# Patient Record
Sex: MALE | Race: WHITE | Employment: FULL TIME | URBAN - NONMETROPOLITAN AREA
[De-identification: names, ages, dates, MRNs, and addresses within clinical notes are randomized per-mention and may not be internally consistent; named-entity substitution may affect disease eponyms.]

---

## 2018-02-22 ENCOUNTER — HOSPITAL ENCOUNTER (EMERGENCY)
Age: 58
Discharge: HOME | End: 2018-02-22
Attending: FAMILY MEDICINE

## 2018-02-22 ENCOUNTER — APPOINTMENT (OUTPATIENT)
Dept: CT IMAGING | Age: 58
End: 2018-02-22

## 2018-02-22 ENCOUNTER — APPOINTMENT (OUTPATIENT)
Dept: GENERAL RADIOLOGY | Age: 58
End: 2018-02-22

## 2018-02-22 VITALS
HEART RATE: 86 BPM | RESPIRATION RATE: 18 BRPM | HEIGHT: 75 IN | BODY MASS INDEX: 26.73 KG/M2 | TEMPERATURE: 98.1 F | OXYGEN SATURATION: 94 % | SYSTOLIC BLOOD PRESSURE: 140 MMHG | DIASTOLIC BLOOD PRESSURE: 87 MMHG | WEIGHT: 215 LBS

## 2018-02-22 DIAGNOSIS — M54.2 NECK PAIN: ICD-10-CM

## 2018-02-22 DIAGNOSIS — I10 ESSENTIAL HYPERTENSION: ICD-10-CM

## 2018-02-22 DIAGNOSIS — M43.6 TORTICOLLIS: Primary | ICD-10-CM

## 2018-02-22 LAB
ALBUMIN SERPL-MCNC: 3.5 G/DL (ref 3.5–5.1)
ALP BLD-CCNC: 81 U/L (ref 38–126)
ALT SERPL-CCNC: 9 U/L (ref 11–66)
ANION GAP SERPL CALCULATED.3IONS-SCNC: 14 MEQ/L (ref 8–16)
APTT: 26.1 SECONDS (ref 22–38)
AST SERPL-CCNC: 11 U/L (ref 5–40)
BASOPHILS # BLD: 0.1 %
BASOPHILS ABSOLUTE: 0 THOU/MM3 (ref 0–0.1)
BILIRUB SERPL-MCNC: 0.4 MG/DL (ref 0.3–1.2)
BUN BLDV-MCNC: 23 MG/DL (ref 7–22)
CALCIUM SERPL-MCNC: 9.7 MG/DL (ref 8.5–10.5)
CHLORIDE BLD-SCNC: 92 MEQ/L (ref 98–111)
CO2: 25 MEQ/L (ref 23–33)
CREAT SERPL-MCNC: 0.8 MG/DL (ref 0.4–1.2)
EKG ATRIAL RATE: 83 BPM
EKG P AXIS: 40 DEGREES
EKG P-R INTERVAL: 172 MS
EKG Q-T INTERVAL: 376 MS
EKG QRS DURATION: 102 MS
EKG QTC CALCULATION (BAZETT): 441 MS
EKG R AXIS: 62 DEGREES
EKG T AXIS: 63 DEGREES
EKG VENTRICULAR RATE: 83 BPM
EOSINOPHIL # BLD: 0 %
EOSINOPHILS ABSOLUTE: 0 THOU/MM3 (ref 0–0.4)
GFR SERPL CREATININE-BSD FRML MDRD: > 90 ML/MIN/1.73M2
GLUCOSE BLD-MCNC: 240 MG/DL (ref 70–108)
HCT VFR BLD CALC: 48.8 % (ref 42–52)
HEMOGLOBIN: 16.4 GM/DL (ref 14–18)
INR BLD: 1.09 (ref 0.85–1.13)
LYMPHOCYTES # BLD: 7 %
LYMPHOCYTES ABSOLUTE: 0.9 THOU/MM3 (ref 1–4.8)
MCH RBC QN AUTO: 30.5 PG (ref 27–31)
MCHC RBC AUTO-ENTMCNC: 33.5 GM/DL (ref 33–37)
MCV RBC AUTO: 91.1 FL (ref 80–94)
MONOCYTES # BLD: 6.9 %
MONOCYTES ABSOLUTE: 0.9 THOU/MM3 (ref 0.4–1.3)
NUCLEATED RED BLOOD CELLS: 0 /100 WBC
OSMOLALITY CALCULATION: 274.2 MOSMOL/KG (ref 275–300)
PDW BLD-RTO: 13.1 % (ref 11.5–14.5)
PLATELET # BLD: 202 THOU/MM3 (ref 130–400)
PMV BLD AUTO: 6.9 FL (ref 7.4–10.4)
POTASSIUM SERPL-SCNC: 3.8 MEQ/L (ref 3.5–5.2)
RBC # BLD: 5.36 MILL/MM3 (ref 4.7–6.1)
SEG NEUTROPHILS: 86 %
SEGMENTED NEUTROPHILS ABSOLUTE COUNT: 11.5 THOU/MM3 (ref 1.8–7.7)
SODIUM BLD-SCNC: 131 MEQ/L (ref 135–145)
TOTAL PROTEIN: 7.4 G/DL (ref 6.1–8)
TROPONIN T: < 0.01 NG/ML
WBC # BLD: 13.4 THOU/MM3 (ref 4.8–10.8)

## 2018-02-22 PROCEDURE — 6370000000 HC RX 637 (ALT 250 FOR IP): Performed by: FAMILY MEDICINE

## 2018-02-22 PROCEDURE — 99284 EMERGENCY DEPT VISIT MOD MDM: CPT

## 2018-02-22 PROCEDURE — 70490 CT SOFT TISSUE NECK W/O DYE: CPT

## 2018-02-22 PROCEDURE — 85730 THROMBOPLASTIN TIME PARTIAL: CPT

## 2018-02-22 PROCEDURE — 36415 COLL VENOUS BLD VENIPUNCTURE: CPT

## 2018-02-22 PROCEDURE — 71045 X-RAY EXAM CHEST 1 VIEW: CPT

## 2018-02-22 PROCEDURE — 93005 ELECTROCARDIOGRAM TRACING: CPT | Performed by: FAMILY MEDICINE

## 2018-02-22 PROCEDURE — 80053 COMPREHEN METABOLIC PANEL: CPT

## 2018-02-22 PROCEDURE — 96372 THER/PROPH/DIAG INJ SC/IM: CPT

## 2018-02-22 PROCEDURE — 85025 COMPLETE CBC W/AUTO DIFF WBC: CPT

## 2018-02-22 PROCEDURE — 84484 ASSAY OF TROPONIN QUANT: CPT

## 2018-02-22 PROCEDURE — 85610 PROTHROMBIN TIME: CPT

## 2018-02-22 PROCEDURE — 6360000002 HC RX W HCPCS: Performed by: FAMILY MEDICINE

## 2018-02-22 RX ORDER — HYDROCODONE BITARTRATE AND ACETAMINOPHEN 5; 325 MG/1; MG/1
1 TABLET ORAL EVERY 6 HOURS PRN
Qty: 12 TABLET | Refills: 0 | Status: SHIPPED | OUTPATIENT
Start: 2018-02-22 | End: 2018-02-25

## 2018-02-22 RX ORDER — DIAZEPAM 5 MG/1
5 TABLET ORAL ONCE
Status: COMPLETED | OUTPATIENT
Start: 2018-02-22 | End: 2018-02-22

## 2018-02-22 RX ORDER — IBUPROFEN 800 MG/1
800 TABLET ORAL EVERY 8 HOURS PRN
Qty: 30 TABLET | Refills: 0 | Status: ON HOLD | OUTPATIENT
Start: 2018-02-22 | End: 2018-03-07 | Stop reason: HOSPADM

## 2018-02-22 RX ORDER — KETOROLAC TROMETHAMINE 30 MG/ML
60 INJECTION, SOLUTION INTRAMUSCULAR; INTRAVENOUS ONCE
Status: COMPLETED | OUTPATIENT
Start: 2018-02-22 | End: 2018-02-22

## 2018-02-22 RX ORDER — HYDROCODONE BITARTRATE AND ACETAMINOPHEN 5; 325 MG/1; MG/1
1 TABLET ORAL ONCE
Status: COMPLETED | OUTPATIENT
Start: 2018-02-22 | End: 2018-02-22

## 2018-02-22 RX ORDER — DIAZEPAM 5 MG/1
5 TABLET ORAL EVERY 8 HOURS PRN
Qty: 10 TABLET | Refills: 0 | Status: ON HOLD | OUTPATIENT
Start: 2018-02-22 | End: 2018-03-07 | Stop reason: HOSPADM

## 2018-02-22 RX ORDER — ASPIRIN 325 MG
325 TABLET ORAL DAILY
Status: ON HOLD | COMMUNITY
End: 2018-03-07 | Stop reason: HOSPADM

## 2018-02-22 RX ADMIN — DIAZEPAM 5 MG: 5 TABLET ORAL at 11:16

## 2018-02-22 RX ADMIN — HYDROCODONE BITARTRATE AND ACETAMINOPHEN 1 TABLET: 5; 325 TABLET ORAL at 11:16

## 2018-02-22 RX ADMIN — KETOROLAC TROMETHAMINE 60 MG: 30 INJECTION, SOLUTION INTRAMUSCULAR at 13:22

## 2018-02-22 ASSESSMENT — PAIN SCALES - GENERAL
PAINLEVEL_OUTOF10: 10

## 2018-02-22 ASSESSMENT — ENCOUNTER SYMPTOMS
COUGH: 0
EYE DISCHARGE: 0
ABDOMINAL PAIN: 0
BLOOD IN STOOL: 0
CHEST TIGHTNESS: 0
EYE PAIN: 0
SORE THROAT: 0
SHORTNESS OF BREATH: 0

## 2018-02-22 ASSESSMENT — PAIN DESCRIPTION - LOCATION: LOCATION: NECK

## 2018-02-22 ASSESSMENT — PAIN DESCRIPTION - DESCRIPTORS: DESCRIPTORS: STABBING

## 2018-02-22 ASSESSMENT — PAIN DESCRIPTION - PAIN TYPE: TYPE: ACUTE PAIN

## 2018-02-22 ASSESSMENT — PAIN DESCRIPTION - FREQUENCY: FREQUENCY: CONTINUOUS

## 2018-02-22 NOTE — ED TRIAGE NOTES
Pt to ED room 1 for c/o neck pain since Sunday morning. Pt states, \"When I went home from work I couldn't turn my head because my neck hurt so bad. I have a big knot in there now and I have been using ice and heat and taking ibuprofen. \"  Rates pain 10/10. Isabela Horowitz MD at bedside for patient evaluation.

## 2018-02-22 NOTE — CARE COORDINATION
Brief ED Social Work Assessment  2/22/18, 11:56 AM    Patient stated that he does not have insurance and that he does not want a PCP. Patient to make provider aware of no insurance. Patient and wife denied any needs.

## 2018-02-22 NOTE — ED PROVIDER NOTES
no gallop and no friction rub. No murmur heard. Pulmonary/Chest: Effort normal. He has no wheezes. He has no rales. Abdominal: He exhibits no distension. There is no tenderness. There is no rebound and no guarding. Musculoskeletal: He exhibits no edema or tenderness. Lymphadenopathy:     He has no cervical adenopathy. Neurological: No cranial nerve deficit. Coordination normal.   Skin: No rash noted. No erythema. DIFFERENTIAL DIAGNOSIS:   Hematoma in the in the sternocleidomastoid, possible mass, possible new ENT cancer, possible adenopathy from upper respiratory tract infection. Also the patient has some concurrent chest pain with hypertensive response. DIAGNOSTIC RESULTS     EKG: All EKG's are interpreted by the Emergency Department Physician who either signs or Co-signs this chart in the absence of a cardiologist.  EKG performed at [phone] with a heart rate of 83, normal rate and rhythm left axis deviation nonspecific ST segment changes overall. RADIOLOGY: non-plain film images(s) such as CT, Ultrasound and MRI are read by the radiologist.  Plain radiographic images are visualized and preliminarily interpreted by the emergency physician unless otherwise stated below. Ct Soft Tissue Neck Wo Contrast    Result Date: 2/22/2018  PROCEDURE: CT SOFT TISSUE NECK WO CONTRAST CLINICAL INFORMATION: Right sternocleidomastoid mass TECHNIQUE: CT of the neck was performed without the use of intravenous contrast at the request of the ordering physician. Axial images as well as coronal and sagittal reconstructions were obtained. All CT scans at this facility use dose modulation, iterative reconstruction, and/or weight-based dosing when appropriate to reduce radiation dose to as low as reasonably achievable. COMPARISON: None FINDINGS: Evaluation is limited due to absence of contrast. The nasopharynx, tongue base and perform sinuses are normal. There is no tonsillar enlargement.  Submandibular and parotid glands are preserved. There is no lymphadenopathy or abnormal fluid collection. No focal soft tissue mass is identified on the limited noncontrast examination. The bilateral sternocleidomastoid muscles are symmetric. There are calcifications in the external carotid arteries. Visualized brain parenchyma, orbits, paranasal  sinuses and mastoid air cells are unremarkable. Minimal atherosclerotic calcifications are present in the thoracic aorta. Unremarkable noncontrast CT of the neck. Final report electronically signed by Dr. Johnny Mcgill on 2/22/2018 11:54 AM    Xr Chest 1 Vw    Result Date: 2/22/2018  PROCEDURE: XR CHEST 1 VIEW CLINICAL INFORMATION: chest pain, COMPARISON: No prior study. TECHNIQUE: A single PA view of the chest was obtained. 1. Normal heart size. Metallic sternotomy sutures and vascular clips from prior surgery. 2. No acute findings. No infiltrates or effusions are seen.  Final report electronically signed by Dr. Margot Muir on 2/22/2018 11:50 AM      LABS:   Labs Reviewed   CBC WITH AUTO DIFFERENTIAL - Abnormal; Notable for the following:        Result Value    WBC 13.4 (*)     MPV 6.9 (*)     Segs Absolute 11.5 (*)     Lymphocytes # 0.9 (*)     All other components within normal limits   COMPREHENSIVE METABOLIC PANEL - Abnormal; Notable for the following:     Glucose 240 (*)     BUN 23 (*)     Sodium 131 (*)     Chloride 92 (*)     ALT 9 (*)     All other components within normal limits   OSMOLALITY - Abnormal; Notable for the following:     Osmolality Calc 274.2 (*)     All other components within normal limits   PROTIME-INR   APTT   TROPONIN   GLOMERULAR FILTRATION RATE, ESTIMATED   ANION GAP       EMERGENCY DEPARTMENT COURSE:   Vitals:    Vitals:    02/22/18 1048 02/22/18 1203   BP: (!) 197/113 (!) 140/87   Pulse: 85 86   Resp: 20 18   Temp: 98.1 °F (36.7 °C)    TempSrc: Oral    SpO2: 96% 94%   Weight: 215 lb (97.5 kg)    Height: 6' 3\" (1.905 m)      The actual mass in his right neck is actually resolved after initial medications with Norco and some Valium but the patient is still complaining of pain that was even as bad as it was when he first came in even though he does begrudgingly tell me it's better now. The patient does want another shot or pain medication before discharge, I have discussed with them the nature of the medications we've already use and that they are quite strong he does have somewhat of a refractory stamina to them however and does not appear to be affected whatsoever at this time. Results of the CT scan and the need for  further follow-up. CRITICAL CARE:   none    CONSULTS:  None    PROCEDURES:  None    FINAL IMPRESSION      1. Torticollis    2. Neck pain    3. Essential hypertension          DISPOSITION/PLAN   discharge    PATIENT REFERRED TO:  [address]  [address]  Schedule an appointment as soon as possible for a visit in 1 week  With primary care doctor to follow up from ER      DISCHARGE MEDICATIONS:  New Prescriptions    DIAZEPAM (VALIUM) 5 MG TABLET    Take 1 tablet by mouth every 8 hours as needed for Anxiety for up to 10 doses. HYDROCODONE-ACETAMINOPHEN (NORCO) 5-325 MG PER TABLET    Take 1 tablet by mouth every 6 hours as needed for Pain for up to 3 days . Take lowest dose possible to manage pain.     IBUPROFEN (ADVIL;MOTRIN) 800 MG TABLET    Take 1 tablet by mouth every 8 hours as needed for Pain       (Please note that portions of this note were completed with a voice recognition program.  Efforts were made to edit the dictations but occasionally words are mis-transcribed.)    MD Babak Goldstein MD  02/22/18 3048

## 2018-02-23 PROCEDURE — 93010 ELECTROCARDIOGRAM REPORT: CPT | Performed by: INTERNAL MEDICINE

## 2018-03-01 ENCOUNTER — APPOINTMENT (OUTPATIENT)
Dept: GENERAL RADIOLOGY | Age: 58
DRG: 023 | End: 2018-03-01
Payer: MEDICAID

## 2018-03-01 ENCOUNTER — APPOINTMENT (OUTPATIENT)
Dept: CT IMAGING | Age: 58
DRG: 023 | End: 2018-03-01
Payer: MEDICAID

## 2018-03-01 ENCOUNTER — HOSPITAL ENCOUNTER (INPATIENT)
Age: 58
LOS: 6 days | Discharge: HOME HEALTH | DRG: 023 | End: 2018-03-07
Attending: EMERGENCY MEDICINE | Admitting: INTERNAL MEDICINE
Payer: MEDICAID

## 2018-03-01 DIAGNOSIS — R03.0 ELEVATED BLOOD PRESSURE READING: ICD-10-CM

## 2018-03-01 DIAGNOSIS — M46.20 PARASPINAL ABSCESS (HCC): ICD-10-CM

## 2018-03-01 DIAGNOSIS — L02.11 ABSCESS OF NECK: Primary | ICD-10-CM

## 2018-03-01 LAB
ANION GAP SERPL CALCULATED.3IONS-SCNC: 16 MEQ/L (ref 8–16)
BASOPHILS # BLD: 0.2 %
BASOPHILS ABSOLUTE: 0 THOU/MM3 (ref 0–0.1)
BUN BLDV-MCNC: 22 MG/DL (ref 7–22)
CALCIUM SERPL-MCNC: 9.6 MG/DL (ref 8.5–10.5)
CHLORIDE BLD-SCNC: 95 MEQ/L (ref 98–111)
CO2: 23 MEQ/L (ref 23–33)
CREAT SERPL-MCNC: 0.8 MG/DL (ref 0.4–1.2)
EKG ATRIAL RATE: 74 BPM
EKG P AXIS: 6 DEGREES
EKG P-R INTERVAL: 206 MS
EKG Q-T INTERVAL: 394 MS
EKG QRS DURATION: 110 MS
EKG QTC CALCULATION (BAZETT): 437 MS
EKG R AXIS: 1 DEGREES
EKG T AXIS: 94 DEGREES
EKG VENTRICULAR RATE: 74 BPM
EOSINOPHIL # BLD: 0.6 %
EOSINOPHILS ABSOLUTE: 0.1 THOU/MM3 (ref 0–0.4)
GFR SERPL CREATININE-BSD FRML MDRD: > 90 ML/MIN/1.73M2
GLUCOSE BLD-MCNC: 143 MG/DL (ref 70–108)
HCT VFR BLD CALC: 45 % (ref 42–52)
HEMOGLOBIN: 14.9 GM/DL (ref 14–18)
LIPASE: 58.8 U/L (ref 5.6–51.3)
LYMPHOCYTES # BLD: 14.6 %
LYMPHOCYTES ABSOLUTE: 2.2 THOU/MM3 (ref 1–4.8)
MAGNESIUM: 2 MG/DL (ref 1.6–2.4)
MCH RBC QN AUTO: 30.3 PG (ref 27–31)
MCHC RBC AUTO-ENTMCNC: 33 GM/DL (ref 33–37)
MCV RBC AUTO: 91.7 FL (ref 80–94)
MONOCYTES # BLD: 6.7 %
MONOCYTES ABSOLUTE: 1 THOU/MM3 (ref 0.4–1.3)
NUCLEATED RED BLOOD CELLS: 0 /100 WBC
OSMOLALITY CALCULATION: 274 MOSMOL/KG (ref 275–300)
PDW BLD-RTO: 12.8 % (ref 11.5–14.5)
PLATELET # BLD: 396 THOU/MM3 (ref 130–400)
PMV BLD AUTO: 6.6 FL (ref 7.4–10.4)
POTASSIUM SERPL-SCNC: 4.1 MEQ/L (ref 3.5–5.2)
RBC # BLD: 4.91 MILL/MM3 (ref 4.7–6.1)
SEG NEUTROPHILS: 77.9 %
SEGMENTED NEUTROPHILS ABSOLUTE COUNT: 11.7 THOU/MM3 (ref 1.8–7.7)
SODIUM BLD-SCNC: 134 MEQ/L (ref 135–145)
TROPONIN T: < 0.01 NG/ML
WBC # BLD: 15 THOU/MM3 (ref 4.8–10.8)

## 2018-03-01 PROCEDURE — 96372 THER/PROPH/DIAG INJ SC/IM: CPT

## 2018-03-01 PROCEDURE — 85025 COMPLETE CBC W/AUTO DIFF WBC: CPT

## 2018-03-01 PROCEDURE — 93010 ELECTROCARDIOGRAM REPORT: CPT | Performed by: NUCLEAR MEDICINE

## 2018-03-01 PROCEDURE — 83735 ASSAY OF MAGNESIUM: CPT

## 2018-03-01 PROCEDURE — 6360000004 HC RX CONTRAST MEDICATION: Performed by: NURSE PRACTITIONER

## 2018-03-01 PROCEDURE — 71045 X-RAY EXAM CHEST 1 VIEW: CPT

## 2018-03-01 PROCEDURE — 6370000000 HC RX 637 (ALT 250 FOR IP): Performed by: NURSE PRACTITIONER

## 2018-03-01 PROCEDURE — 96375 TX/PRO/DX INJ NEW DRUG ADDON: CPT

## 2018-03-01 PROCEDURE — 84484 ASSAY OF TROPONIN QUANT: CPT

## 2018-03-01 PROCEDURE — 76376 3D RENDER W/INTRP POSTPROCES: CPT

## 2018-03-01 PROCEDURE — 36415 COLL VENOUS BLD VENIPUNCTURE: CPT

## 2018-03-01 PROCEDURE — 6360000002 HC RX W HCPCS: Performed by: NURSE PRACTITIONER

## 2018-03-01 PROCEDURE — 70491 CT SOFT TISSUE NECK W/DYE: CPT

## 2018-03-01 PROCEDURE — 83690 ASSAY OF LIPASE: CPT

## 2018-03-01 PROCEDURE — 80048 BASIC METABOLIC PNL TOTAL CA: CPT

## 2018-03-01 PROCEDURE — 96374 THER/PROPH/DIAG INJ IV PUSH: CPT

## 2018-03-01 PROCEDURE — 93005 ELECTROCARDIOGRAM TRACING: CPT | Performed by: NURSE PRACTITIONER

## 2018-03-01 PROCEDURE — 99223 1ST HOSP IP/OBS HIGH 75: CPT | Performed by: INTERNAL MEDICINE

## 2018-03-01 PROCEDURE — 87040 BLOOD CULTURE FOR BACTERIA: CPT

## 2018-03-01 PROCEDURE — 99285 EMERGENCY DEPT VISIT HI MDM: CPT

## 2018-03-01 PROCEDURE — 1200000000 HC SEMI PRIVATE

## 2018-03-01 RX ORDER — LISINOPRIL 10 MG/1
10 TABLET ORAL ONCE
Status: COMPLETED | OUTPATIENT
Start: 2018-03-02 | End: 2018-03-02

## 2018-03-01 RX ORDER — CYCLOBENZAPRINE HCL 10 MG
10 TABLET ORAL ONCE
Status: COMPLETED | OUTPATIENT
Start: 2018-03-01 | End: 2018-03-01

## 2018-03-01 RX ORDER — VANCOMYCIN HYDROCHLORIDE 1 G/200ML
1000 INJECTION, SOLUTION INTRAVENOUS ONCE
Status: COMPLETED | OUTPATIENT
Start: 2018-03-02 | End: 2018-03-02

## 2018-03-01 RX ORDER — ONDANSETRON 2 MG/ML
4 INJECTION INTRAMUSCULAR; INTRAVENOUS ONCE
Status: COMPLETED | OUTPATIENT
Start: 2018-03-01 | End: 2018-03-01

## 2018-03-01 RX ORDER — MORPHINE SULFATE 4 MG/ML
4 INJECTION, SOLUTION INTRAMUSCULAR; INTRAVENOUS ONCE
Status: COMPLETED | OUTPATIENT
Start: 2018-03-01 | End: 2018-03-01

## 2018-03-01 RX ORDER — KETOROLAC TROMETHAMINE 30 MG/ML
60 INJECTION, SOLUTION INTRAMUSCULAR; INTRAVENOUS ONCE
Status: COMPLETED | OUTPATIENT
Start: 2018-03-01 | End: 2018-03-01

## 2018-03-01 RX ADMIN — MORPHINE SULFATE 4 MG: 4 INJECTION, SOLUTION INTRAMUSCULAR; INTRAVENOUS at 23:32

## 2018-03-01 RX ADMIN — KETOROLAC TROMETHAMINE 60 MG: 30 INJECTION, SOLUTION INTRAMUSCULAR at 19:34

## 2018-03-01 RX ADMIN — IOPAMIDOL 80 ML: 755 INJECTION, SOLUTION INTRAVENOUS at 21:11

## 2018-03-01 RX ADMIN — CYCLOBENZAPRINE HYDROCHLORIDE 10 MG: 10 TABLET, FILM COATED ORAL at 19:34

## 2018-03-01 RX ADMIN — ONDANSETRON 4 MG: 2 INJECTION INTRAMUSCULAR; INTRAVENOUS at 23:32

## 2018-03-01 ASSESSMENT — ENCOUNTER SYMPTOMS
VOMITING: 0
EYE DISCHARGE: 0
EYE REDNESS: 0
ABDOMINAL PAIN: 0
NAUSEA: 0
WHEEZING: 0
SORE THROAT: 0
COUGH: 0
BACK PAIN: 0
RHINORRHEA: 0
DIARRHEA: 0
SHORTNESS OF BREATH: 0

## 2018-03-01 ASSESSMENT — PAIN DESCRIPTION - ORIENTATION: ORIENTATION: RIGHT

## 2018-03-01 ASSESSMENT — PAIN SCALES - GENERAL
PAINLEVEL_OUTOF10: 10

## 2018-03-01 ASSESSMENT — PAIN DESCRIPTION - LOCATION: LOCATION: NECK

## 2018-03-02 ENCOUNTER — ANESTHESIA (OUTPATIENT)
Dept: OPERATING ROOM | Age: 58
DRG: 023 | End: 2018-03-02
Payer: MEDICAID

## 2018-03-02 ENCOUNTER — ANESTHESIA EVENT (OUTPATIENT)
Dept: OPERATING ROOM | Age: 58
DRG: 023 | End: 2018-03-02
Payer: MEDICAID

## 2018-03-02 ENCOUNTER — APPOINTMENT (OUTPATIENT)
Dept: MRI IMAGING | Age: 58
DRG: 023 | End: 2018-03-02
Payer: MEDICAID

## 2018-03-02 LAB
BASOPHILS # BLD: 0.3 %
BASOPHILS ABSOLUTE: 0 THOU/MM3 (ref 0–0.1)
C-REACTIVE PROTEIN: 4.12 MG/DL (ref 0–1)
EKG ATRIAL RATE: 67 BPM
EKG P AXIS: 37 DEGREES
EKG P-R INTERVAL: 214 MS
EKG Q-T INTERVAL: 398 MS
EKG QRS DURATION: 112 MS
EKG QTC CALCULATION (BAZETT): 420 MS
EKG R AXIS: 22 DEGREES
EKG T AXIS: 105 DEGREES
EKG VENTRICULAR RATE: 67 BPM
EOSINOPHIL # BLD: 1 %
EOSINOPHILS ABSOLUTE: 0.1 THOU/MM3 (ref 0–0.4)
HCT VFR BLD CALC: 45.3 % (ref 42–52)
HEMOGLOBIN: 14.9 GM/DL (ref 14–18)
LYMPHOCYTES # BLD: 17.7 %
LYMPHOCYTES ABSOLUTE: 2.5 THOU/MM3 (ref 1–4.8)
MCH RBC QN AUTO: 30.5 PG (ref 27–31)
MCHC RBC AUTO-ENTMCNC: 33 GM/DL (ref 33–37)
MCV RBC AUTO: 92.5 FL (ref 80–94)
MONOCYTES # BLD: 7.1 %
MONOCYTES ABSOLUTE: 1 THOU/MM3 (ref 0.4–1.3)
NUCLEATED RED BLOOD CELLS: 0 /100 WBC
PDW BLD-RTO: 13.2 % (ref 11.5–14.5)
PLATELET # BLD: 380 THOU/MM3 (ref 130–400)
PMV BLD AUTO: 6.6 FL (ref 7.4–10.4)
RBC # BLD: 4.89 MILL/MM3 (ref 4.7–6.1)
SEDIMENTATION RATE, ERYTHROCYTE: 63 MM/HR (ref 0–10)
SEG NEUTROPHILS: 73.9 %
SEGMENTED NEUTROPHILS ABSOLUTE COUNT: 10.3 THOU/MM3 (ref 1.8–7.7)
WBC # BLD: 13.9 THOU/MM3 (ref 4.8–10.8)

## 2018-03-02 PROCEDURE — 6360000002 HC RX W HCPCS: Performed by: NEUROLOGICAL SURGERY

## 2018-03-02 PROCEDURE — 2580000003 HC RX 258: Performed by: NURSE PRACTITIONER

## 2018-03-02 PROCEDURE — A9579 GAD-BASE MR CONTRAST NOS,1ML: HCPCS | Performed by: NEUROLOGICAL SURGERY

## 2018-03-02 PROCEDURE — 6360000002 HC RX W HCPCS: Performed by: INTERNAL MEDICINE

## 2018-03-02 PROCEDURE — 86140 C-REACTIVE PROTEIN: CPT

## 2018-03-02 PROCEDURE — 93005 ELECTROCARDIOGRAM TRACING: CPT | Performed by: INTERNAL MEDICINE

## 2018-03-02 PROCEDURE — 99232 SBSQ HOSP IP/OBS MODERATE 35: CPT | Performed by: INTERNAL MEDICINE

## 2018-03-02 PROCEDURE — 85025 COMPLETE CBC W/AUTO DIFF WBC: CPT

## 2018-03-02 PROCEDURE — 6360000002 HC RX W HCPCS: Performed by: NURSE PRACTITIONER

## 2018-03-02 PROCEDURE — 2580000003 HC RX 258: Performed by: INTERNAL MEDICINE

## 2018-03-02 PROCEDURE — 1200000000 HC SEMI PRIVATE

## 2018-03-02 PROCEDURE — 85651 RBC SED RATE NONAUTOMATED: CPT

## 2018-03-02 PROCEDURE — 6370000000 HC RX 637 (ALT 250 FOR IP): Performed by: INTERNAL MEDICINE

## 2018-03-02 PROCEDURE — 93010 ELECTROCARDIOGRAM REPORT: CPT | Performed by: NUCLEAR MEDICINE

## 2018-03-02 PROCEDURE — 6360000004 HC RX CONTRAST MEDICATION: Performed by: NEUROLOGICAL SURGERY

## 2018-03-02 PROCEDURE — 72156 MRI NECK SPINE W/O & W/DYE: CPT

## 2018-03-02 PROCEDURE — 36415 COLL VENOUS BLD VENIPUNCTURE: CPT

## 2018-03-02 RX ORDER — SODIUM CHLORIDE 0.9 % (FLUSH) 0.9 %
10 SYRINGE (ML) INJECTION EVERY 12 HOURS SCHEDULED
Status: DISCONTINUED | OUTPATIENT
Start: 2018-03-02 | End: 2018-03-03 | Stop reason: HOSPADM

## 2018-03-02 RX ORDER — SODIUM CHLORIDE 9 MG/ML
INJECTION, SOLUTION INTRAVENOUS CONTINUOUS
Status: DISCONTINUED | OUTPATIENT
Start: 2018-03-02 | End: 2018-03-03

## 2018-03-02 RX ORDER — DIAZEPAM 5 MG/1
5 TABLET ORAL EVERY 8 HOURS PRN
Status: DISCONTINUED | OUTPATIENT
Start: 2018-03-02 | End: 2018-03-03

## 2018-03-02 RX ORDER — SODIUM CHLORIDE 0.9 % (FLUSH) 0.9 %
10 SYRINGE (ML) INJECTION PRN
Status: DISCONTINUED | OUTPATIENT
Start: 2018-03-02 | End: 2018-03-03 | Stop reason: HOSPADM

## 2018-03-02 RX ORDER — ACETAMINOPHEN 325 MG/1
650 TABLET ORAL EVERY 4 HOURS PRN
Status: DISCONTINUED | OUTPATIENT
Start: 2018-03-02 | End: 2018-03-03

## 2018-03-02 RX ORDER — DOCUSATE SODIUM 100 MG/1
100 CAPSULE, LIQUID FILLED ORAL 2 TIMES DAILY
Status: DISCONTINUED | OUTPATIENT
Start: 2018-03-02 | End: 2018-03-03 | Stop reason: SDUPTHER

## 2018-03-02 RX ORDER — SODIUM CHLORIDE 0.9 % (FLUSH) 0.9 %
10 SYRINGE (ML) INJECTION PRN
Status: DISCONTINUED | OUTPATIENT
Start: 2018-03-02 | End: 2018-03-06 | Stop reason: SDUPTHER

## 2018-03-02 RX ORDER — ONDANSETRON 2 MG/ML
4 INJECTION INTRAMUSCULAR; INTRAVENOUS EVERY 6 HOURS PRN
Status: DISCONTINUED | OUTPATIENT
Start: 2018-03-02 | End: 2018-03-07 | Stop reason: HOSPADM

## 2018-03-02 RX ORDER — SODIUM CHLORIDE 0.9 % (FLUSH) 0.9 %
10 SYRINGE (ML) INJECTION EVERY 12 HOURS SCHEDULED
Status: DISCONTINUED | OUTPATIENT
Start: 2018-03-02 | End: 2018-03-06 | Stop reason: SDUPTHER

## 2018-03-02 RX ADMIN — ACETAMINOPHEN 650 MG: 325 TABLET ORAL at 03:17

## 2018-03-02 RX ADMIN — GADOTERIDOL 20 ML: 279.3 INJECTION, SOLUTION INTRAVENOUS at 09:07

## 2018-03-02 RX ADMIN — HYDROMORPHONE HYDROCHLORIDE 0.5 MG: 1 INJECTION, SOLUTION INTRAMUSCULAR; INTRAVENOUS; SUBCUTANEOUS at 02:01

## 2018-03-02 RX ADMIN — VANCOMYCIN HYDROCHLORIDE 1000 MG: 1 INJECTION, SOLUTION INTRAVENOUS at 00:18

## 2018-03-02 RX ADMIN — CEFEPIME HYDROCHLORIDE 1 G: 1 INJECTION, POWDER, FOR SOLUTION INTRAMUSCULAR; INTRAVENOUS at 23:11

## 2018-03-02 RX ADMIN — HYDROMORPHONE HYDROCHLORIDE 1 MG: 1 INJECTION, SOLUTION INTRAMUSCULAR; INTRAVENOUS; SUBCUTANEOUS at 16:47

## 2018-03-02 RX ADMIN — VANCOMYCIN HYDROCHLORIDE 1500 MG: 10 INJECTION, POWDER, LYOPHILIZED, FOR SOLUTION INTRAVENOUS at 13:42

## 2018-03-02 RX ADMIN — DIAZEPAM 5 MG: 5 TABLET ORAL at 08:19

## 2018-03-02 RX ADMIN — HYDROMORPHONE HYDROCHLORIDE 1 MG: 1 INJECTION, SOLUTION INTRAMUSCULAR; INTRAVENOUS; SUBCUTANEOUS at 21:56

## 2018-03-02 RX ADMIN — HYDROMORPHONE HYDROCHLORIDE 1 MG: 1 INJECTION, SOLUTION INTRAMUSCULAR; INTRAVENOUS; SUBCUTANEOUS at 13:44

## 2018-03-02 RX ADMIN — HYDROMORPHONE HYDROCHLORIDE 0.5 MG: 1 INJECTION, SOLUTION INTRAMUSCULAR; INTRAVENOUS; SUBCUTANEOUS at 10:30

## 2018-03-02 RX ADMIN — LISINOPRIL 10 MG: 10 TABLET ORAL at 00:18

## 2018-03-02 RX ADMIN — SODIUM CHLORIDE: 9 INJECTION, SOLUTION INTRAVENOUS at 03:29

## 2018-03-02 RX ADMIN — HYDROMORPHONE HYDROCHLORIDE 0.5 MG: 1 INJECTION, SOLUTION INTRAMUSCULAR; INTRAVENOUS; SUBCUTANEOUS at 05:55

## 2018-03-02 RX ADMIN — CEFEPIME HYDROCHLORIDE 1 G: 1 INJECTION, POWDER, FOR SOLUTION INTRAMUSCULAR; INTRAVENOUS at 11:38

## 2018-03-02 RX ADMIN — PIPERACILLIN SODIUM,TAZOBACTAM SODIUM 3.38 G: 3; .375 INJECTION, POWDER, FOR SOLUTION INTRAVENOUS at 03:18

## 2018-03-02 ASSESSMENT — ENCOUNTER SYMPTOMS
CONSTIPATION: 0
RECTAL PAIN: 0
WHEEZING: 0
EYE ITCHING: 0
STRIDOR: 0
APNEA: 0
ANAL BLEEDING: 0
FACIAL SWELLING: 0
CHOKING: 0
VOMITING: 0
VOICE CHANGE: 0
SHORTNESS OF BREATH: 0
COLOR CHANGE: 0
SINUS PAIN: 0
NAUSEA: 0
ABDOMINAL DISTENTION: 0
EYE REDNESS: 0

## 2018-03-02 ASSESSMENT — PAIN DESCRIPTION - PROGRESSION
CLINICAL_PROGRESSION: NOT CHANGED

## 2018-03-02 ASSESSMENT — PAIN SCALES - GENERAL
PAINLEVEL_OUTOF10: 10
PAINLEVEL_OUTOF10: 10
PAINLEVEL_OUTOF10: 8
PAINLEVEL_OUTOF10: 10
PAINLEVEL_OUTOF10: 9
PAINLEVEL_OUTOF10: 8
PAINLEVEL_OUTOF10: 9
PAINLEVEL_OUTOF10: 10
PAINLEVEL_OUTOF10: 8
PAINLEVEL_OUTOF10: 10

## 2018-03-02 ASSESSMENT — PAIN DESCRIPTION - PAIN TYPE
TYPE: ACUTE PAIN

## 2018-03-02 ASSESSMENT — PAIN DESCRIPTION - ORIENTATION
ORIENTATION: POSTERIOR
ORIENTATION: RIGHT
ORIENTATION: POSTERIOR

## 2018-03-02 ASSESSMENT — PAIN DESCRIPTION - LOCATION
LOCATION: NECK

## 2018-03-02 ASSESSMENT — PAIN DESCRIPTION - DESCRIPTORS: DESCRIPTORS: STABBING

## 2018-03-02 ASSESSMENT — PAIN DESCRIPTION - FREQUENCY
FREQUENCY: CONTINUOUS

## 2018-03-02 ASSESSMENT — PAIN DESCRIPTION - ONSET
ONSET: ON-GOING
ONSET: ON-GOING

## 2018-03-02 NOTE — H&P
[facility] - History & Physical      PCP: No primary care provider on file. Date of Admission: 3/1/2018    Date of Service: Pt seen/examined on 3/1/2018 and Admitted to Inpatient with expected LOS greater than two midnights due to medical therapy. .    Chief Complaint:  Right neck and shoulder pain    History Of Present Illness: The patient is a 59 y.o. male who presented to Ed with 10 day history of progressively worsening pain of the right side of the neck and shoulder. He had come to the ED on 2/22 with similar complain and seen and discharged without antibiotics but symptoms progressed. He has had no fever but has been losing weight. He was suspected to have diabetes and HTN but has not been compliant o treatment or follow up. He says he thought he had no insurance. No GI, , Cardiac or respiratory symptoms. Pain in the neck worse on movements of neck or right arm but no neurological symptoms. Neurosurgery and orthopedic contacted and patient scheduled for possible surgery in am.    Past Medical History:    No past medical history on file. Past Surgical History:    No past surgical history on file. Home Medications:  Prior to Admission medications    Medication Sig Start Date End Date Taking? Authorizing Provider   aspirin 325 MG tablet Take 325 mg by mouth daily    Historical Provider, MD   diazepam (VALIUM) 5 MG tablet Take 1 tablet by mouth every 8 hours as needed for Anxiety for up to 10 doses. 2/22/18 3/4/18  Vesna Schuler MD   ibuprofen (ADVIL;MOTRIN) 800 MG tablet Take 1 tablet by mouth every 8 hours as needed for Pain 2/22/18   Vesna Schuler MD       Allergies:    Patient has no known allergies. Social History:    The patient currently lives at home  Tobacco:   reports that he has never smoked. He has never used smokeless tobacco.  Alcohol:   reports that he does not drink alcohol.   Illicit Drugs:     Family History:     Reviewed in detail and negative for DM, CAD, Cancer, CVA. Positive as follows:  No family history on file. Review of Systems:   Pertinent items are noted in HPI. Physical Examination:  General Appearance:  awake, alert, oriented, in no acute distress  Skin:  Skin color, texture, turgor normal. No rashes or lesions. Eyes:  No gross abnormalities. and PERRL  Neck:  Swelling right side of the neck tenderness  Lungs:  Normal expansion. Clear to auscultation. No rales, rhonchi, or wheezing. Heart:  Heart sounds are normal.  Regular rate and rhythm without murmur, gallop or rub. Abdomen:  Soft, non-tender, normal bowel sounds. No bruits, organomegaly or masses. Extremities: Extremities warm to touch, pink, with no edema. Musculoskeletal:  Pain on movement of the right arm and shoulder  Neurologic:  negative    Diagnostic Data:  CXR:  I have reviewed the report with the following interpretation: No acute cardiopulmonary disease  EKG:  I have reviewed the EKG with the following interpretation: Normal sinus rhythm  Minimal voltage criteria for LVH, may be normal variant  Inferior infarct , age undetermined  T wave abnormality, consider lateral ischemia  Abnormal ECG  LABS:   CBC:  Recent Labs      03/01/18 1933   WBC  15.0*   HGB  14.9   HCT  45.0   PLT  396     BMP:  Recent Labs      03/01/18 1933   NA  134*   K  4.1   CL  95*   CO2  23   BUN  22   CREATININE  0.8   CALCIUM  9.6   No results for input(s): AST, ALT, BILIDIR, BILITOT, ALKPHOS in the last 72 hours. Coag Panel: No results for input(s): INR, PROTIME, APTT in the last 72 hours. Cardiac Enzymes: No results for input(s): Sameul Malathi in the last 72 hours. ABGs:No results found for: PHART, PO2ART, TEA8IVD  Urinalysis:No results found for: NITRU, [address], BACTERIA, RBCUA, BLOODU, SPECGRAV, GLUCOSEU  CT NECK  No acute fractures. 2.  Discogenic and hypertrophic cervical spine degenerative changes as described level by level most notably at C5-C6 and C6-C7.    3.  Intramuscular abscess right paraspinous neck musculature as described on the CT scan soft tissue neck of same day. Please refer to that report       Assessment:  Active Hospital Problems    Diagnosis Date Noted    Paraspinal abscess ([facility] [place] [address] [M46.20] 03/01/2018   Hypertension    Plan:  1. Zosyn and vanco  2. Lisinopril  3. Neurosurgical follow up  4.  Analgesics    DVT Prophylaxis: Lovenox  Diet: Diet NPO, After Midnight  Code Status: No Order  GI Prophylaxis:   PT/OT Eval Status: Amelia Alberts MD  5/2/148815:24 PM

## 2018-03-02 NOTE — CARE COORDINATION
3/2/18, 9:34 AM      Adalberto Mendes       Admitted from: ED 3/1/2018/ [address] day: 1   Location: [facility]06/006-A Reason for admit: Paraspinal abscess ([facility] [address] [M46.20] Status: inpatient  Admit order signed?: yes  PMH:  has a past medical history of CAD (coronary artery disease); CHF (congestive heart failure) ([facility] [address] and Diabetes mellitus ([facility] [address] Procedure: none yet  Pertinent abnormal Imaging: CT scan  1.   There is an intramuscular abscess in the muscle or muscle groups medial to the right levatore scapulae. This collection appears to be multiloculated and contains at least 2 small gas bubbles. 2.  Increased density in the right epidural space from C2 down to the C5 level. This is worrisome for enhancement secondary to epidural inflammatory changes, or epidural collection. Recommend MRI of the cervical spine with IV contrast.       Medications:  Scheduled Meds:   vancomycin  1,500 mg Intravenous Q12H    sodium chloride flush  10 mL Intravenous 2 times per day    docusate sodium  100 mg Oral BID    enoxaparin  40 mg Subcutaneous Daily    [START ON 3/3/2018] influenza virus vaccine  0.5 mL Intramuscular Once     Continuous Infusions:   sodium chloride 50 mL/hr at 03/02/18 0329      Pertinent Info/Orders/Treatment Plan:  Dr Bing Chahal consulted for paraspinal abscess. IV antibiotics. N/V checks. Pain management. I&O. Daily weights. Diet: Diet NPO, After Midnight   DVT Prophylaxis: Lovenox  Smoking status:  reports that he has never smoked.  He has never used smokeless tobacco.   Influenza Vaccination Screening Completed: yes  Pneumonia Vaccination Screening Completed: yes  Core measures: vte  PCP: would like PCP appt at [address] [place] [address] at discharge  Readmission:   no  Risk Score: 7.25     Discharge Planning  Current Residence:  Private Residence  Living Arrangements:  Alone   Support Systems:  Family Members, Children  Current Services PTA:     Potential Assistance Needed:  N/A  Potential Assistance Purchasing

## 2018-03-02 NOTE — PROGRESS NOTES
color change, rash and wound. Neurological: Negative for dizziness, tremors, speech difficulty, weakness, numbness and headaches. Hematological: Negative for adenopathy. Does not bruise/bleed easily. Psychiatric/Behavioral: Negative for agitation, confusion, hallucinations, self-injury, sleep disturbance and suicidal ideas. Physical Exam   Constitutional: He is oriented to person, place, and time. He appears well-developed and well-nourished. No distress. HENT:   Head: Normocephalic and atraumatic. Right Ear: External ear normal.   Left Ear: External ear normal.   Nose: Nose normal.   Mouth/Throat: Oropharynx is clear and moist. No oropharyngeal exudate. Eyes: Conjunctivae and EOM are normal. Pupils are equal, round, and reactive to light. Right eye exhibits no discharge. Left eye exhibits no discharge. No scleral icterus. Neck: Normal range of motion. Neck supple. No JVD present. No tracheal deviation present. No thyromegaly present. Cardiovascular: Normal rate, regular rhythm, normal heart sounds and intact distal pulses. Exam reveals no gallop and no friction rub. No murmur heard. Pulmonary/Chest: Effort normal and breath sounds normal. No stridor. No respiratory distress. He has no wheezes. He has no rales. He exhibits no tenderness. Abdominal: Soft. Bowel sounds are normal. He exhibits no distension and no mass. There is no tenderness. There is no rebound and no guarding. Musculoskeletal: Normal range of motion. He exhibits tenderness. He exhibits no edema or deformity. Right neck tenderness. Lymphadenopathy:     He has no cervical adenopathy. Neurological: He is alert and oriented to person, place, and time. He displays normal reflexes. No cranial nerve deficit. He exhibits normal muscle tone. Coordination normal.   Skin: Skin is warm and dry. No rash noted. He is not diaphoretic. No erythema. No pallor. Psychiatric: He has a normal mood and affect.  His behavior is normal. arthritis of the right facet joint of the C4-5 level with an associated 4.7 cm in length epidural abscess in the dorsal and right lateral aspects of the cervical spinal canal causing severe spinal canal at the C4-5 level. There is also a large    abscess in the posterior paraspinal musculature on the right. There is severe right foraminal stenosis at the C4-5 level.       2. Moderate-severe spinal canal stenosis at the C3-4 level due to a combination of the epidural abscess and degenerative changes.       3. Significant spinal canal stenosis at the C5-6 and C6-7 levels which is mostly due to degenerative changes.         ASSESSMENT AND  PLAN. 1.NEUROVASCULAR. C-SPINE DJD W/ SEVERE C4-C5 STENOSIS. RIGHT PARASPINAL ABSCESS. NEUROSURGICAL CONSULT DONE. 2.CARDIOVASCULAR. HTN-CONTROLLED. CAD W/ [id number] AND ASYMPTOMATIC. 3.ID. C2-C5 EPIDURAL ABSCESS- ON IV VANCOMYCIN  AND CEFEPIME. ID CONSULT. 4.ENDO. T2 DM-SSI ,A1C CHECK. 5.PSYCH.     ANXIETY DISORDER ON VALIUM PRN. 6.DISPO. PLANNED D/C TO HOME VS REHAB POST OP.       Electronically signed by Josee Hudson MD on 3/2/2018 at 11:46 AM    Rounding Hospitalist

## 2018-03-02 NOTE — ED PROVIDER NOTES
I have seen and examined the patient myself and I have reviewed the [facility]'s chart. Please refer to [facility]-level provider's chart for details. I agree with [facility]'s assessment and plan. CHIEF COMPLAINTS, HISTORY OF ILLNESS AND EVALUATION    This patient is a 59 y.o.male who presents to ED complaining of right side neck pain and shoulder pain ub past 10 days. Seen in ED on 2/22/2018 with CT neck soft tissue done shows unremarkable noncontrast CT of the neck. He is not using IV drugs. No diabetes. Pain is getting worse. Pain is dull deep ache at severity 7/10. Worse on neck and right shoulder movement. Physical exam:     Vitals:    03/01/18 1912 03/01/18 2007 03/01/18 2103 03/01/18 2215   BP: (!) 184/107 (!) 184/108 (!) 147/98    Pulse: 82 77 71 70   Resp: 16 16 18 18   Temp: 98.3 °F (36.8 °C)      TempSrc: Oral      SpO2: 96% 98% 95% 94%   Weight: 215 lb (97.5 kg)        Cardiopulmonary exam unremarkable. No neurological deficients. CT neck soft tissues shows there is an intramuscular abscess in the muscle or muscle groups medial to the right levatore scapulae. This collection appears to be multiloculated and contains at least 2 small gas bubbles. Increased density in the right epidural space from C2 down to the C5 level. This is worrisome for enhancement secondary to epidural inflammatory changes, or epidural collection. Recommend MRI of the cervical spine with IV contrast.     WBC 15.     Patient needs I and D. Ortho/spine is discussed right away. Ortho defers to neurosurgery. Dr. Pedro Dubon wants patient to be admitted to Hospitalist service. Zosyn and Vancomycin IV given in ED. ID consult is called. Cervical spine MRI with and without contrast is pending. ED COURSE  Patient was evaluated by [facility] provider initially. Patient has a stable ED stay. IMPRESSION  1. Abscess of neck    2. Elevated blood pressure reading        [address]  Admitted.      Sonny Ryder VINNY Lynn MD  03/01/18 9662

## 2018-03-02 NOTE — CONSULTS
mg Intravenous Q12H Teena Campbell  mL/hr at 03/02/18 1342 1,500 mg at 03/02/18 1342    0.9 % sodium chloride infusion   Intravenous Continuous Teena Campbell MD 50 mL/hr at 03/02/18 0329      sodium chloride flush 0.9 % injection 10 mL  10 mL Intravenous 2 times per day Teena Campbell MD        sodium chloride flush 0.9 % injection 10 mL  10 mL Intravenous PRN Teena Campbell MD        acetaminophen (TYLENOL) tablet 650 mg  650 mg Oral Q4H PRN Teena Campbell MD   650 mg at 03/02/18 6081    docusate sodium (COLACE) capsule 100 mg  100 mg Oral BID Teena Campbell MD        ondansetron [facility]) injection 4 mg  4 mg Intravenous Q6H PRN Teena Campbell MD        enoxaparin (LOVENOX) injection 40 mg  40 mg Subcutaneous Daily Teena Campbell MD       [facility] [START ON 3/3/2018] influenza quadrivalent split vaccine (FLUZONE;FLUARIX;FLULAVAL;AFLURIA) injection 0.5 mL  0.5 mL Intramuscular Once Teena Campbell MD        cefepime (MAXIPIME) 1 g IVPB minibag  1 g Intravenous Q12H Lila Gonzalez MD   Stopped at 03/02/18 1208    HYDROmorphone (DILAUDID) injection 0.5 mg  0.5 mg Intravenous Q3H PRN Ifrah Mobley MD        Or    HYDROmorphone (DILAUDID) injection 1 mg  1 mg Intravenous Q3H PRN Ifrah Mobley MD   1 mg at 03/02/18 1344    sodium chloride flush 0.9 % injection 10 mL  10 mL Intravenous 2 times per day Ifrah Mobley MD        sodium chloride flush 0.9 % injection 10 mL  10 mL Intravenous PRN Ifrah Mobley MD              diazepam 5 mg Q8H PRN   sodium chloride flush 10 mL PRN   acetaminophen 650 mg Q4H PRN   ondansetron 4 mg Q6H PRN   HYDROmorphone 0.5 mg Q3H PRN   Or     HYDROmorphone 1 mg Q3H PRN   sodium chloride flush 10 mL PRN        sodium chloride 50 mL/hr at 03/02/18 0329         ALLERGIES:   Patient has no known allergies.     PAST MEDICAL  HISTORY:    has a past medical history of CAD (coronary artery disease); CHF (congestive heart failure) ([facility] [address]

## 2018-03-02 NOTE — PROGRESS NOTES
Pt arrived in 7k 6 in a wheelchair. Complaints: neck pain. IV normal saline infusing into the antecubital right, condition patent and no redness at a rate of 50 mls/ hour with about 1,000 mls remaining in the bag. The best day to schedule a follow up Dr appointment is:  Monday p.m. The patient is interested in [facility]. [facility] meds to beds program?:  No    Pt oriented to room and call light. Bed alarm on. Call light within reach.

## 2018-03-02 NOTE — PLAN OF CARE
Problem: Pain:  Goal: Pain level will decrease  Pain level will decrease   Outcome: Ongoing  PT COMPLAINING OF PAIN OF 8-10/10 THIS SHIFT. PRN PAIN MEDICATION GIVEN WHEN AVAILABLE. Problem: Neurological  Goal: Maximum potential motor/sensory/cognitive function  Outcome: Ongoing  PT UP WITH STAND BY ASSIST. ALERT AND ORIENTED X 4.  DENIES HAVING ANY NUMBNESS/TINGLING. Problem: Cardiovascular  Goal: No DVT, peripheral vascular complications  Outcome: Ongoing  NO SIGNS/SYMPTOMS OF DVT THIS SHIFT. SCD'S ON. Problem: Respiratory  Goal: O2 Sat > 90%  Outcome: Ongoing  02 SAT > 90% THIS SHIFT ON ROOM AIR. Problem: GI  Goal: No bowel complications  Outcome: Ongoing  NO BM THIS SHIFT. BOWEL SOUNDS HYPOACTIVE. ABDOMEN SOFT. PT PASSING GAS. Problem:   Goal: Adequate urinary output  Outcome: Ongoing  PT HAS NOT URINATED SINCE ARRIVAL TO FLOOR. WILL CONTINUE TO MONITOR. Problem: Nutrition  Goal: Optimal nutrition therapy  Outcome: Ongoing  PT NPO THIS SHIFT. Problem: Skin Integrity/Risk  Goal: No skin breakdown during hospitalization  Outcome: Ongoing  NO SKIN BREAKDOWN NOTED THIS SHIFT. PT ABLE TO TURN AND REPOSITION SELF. Problem: Musculor/Skeletal Functional Status  Goal: Absence of falls  Outcome: Ongoing  PT USES CALL LIGHT APPROPRIATELY. UP WITH STAND BY ASSIST. NO FALLS THIS SHIFT. Comments: Care plan reviewed with patient. Patient verbalizes understanding of the plan of care and contribute to goal setting.

## 2018-03-02 NOTE — PLAN OF CARE
Problem: Pain:  Goal: Pain level will decrease  Pain level will decrease   Outcome: Ongoing  Pain goal 5. Rating pain from 7-10 on a scale of 1-10 with iv pain meds and valium given per pt request.   Called physician because pt stated meds not relieving pain. Orders received, medication increased and pt satisfied. Problem: Neurological  Goal: Maximum potential motor/sensory/cognitive function  Outcome: Ongoing  Per pt unable to lift right arm up. Surgery scheduled for 730am tomorrow to repair. Problem: Cardiovascular  Goal: No DVT, peripheral vascular complications  Outcome: Met This Shift  No s/s dvt    Problem: Respiratory  Goal: O2 Sat > 90%  Outcome: Met This Shift  o2 sats from 92-95% on room air. Problem: GI  Goal: No bowel complications  Outcome: Ongoing  Abdomen soft with abs x4. Passing flatus. No bm today    Problem:   Goal: Adequate urinary output  Outcome: Met This Shift  Voiding quantities sufficient per bathroom privileges. Problem: Nutrition  Goal: Optimal nutrition therapy  Outcome: Not Met This Shift  Pt was npo for surgery today. Surgery cancelled and pt sent for food. Problem: Skin Integrity/Risk  Goal: No skin breakdown during hospitalization  Outcome: Met This Shift  No new skin issues noted at this time. Problem: Musculor/Skeletal Functional Status  Goal: Absence of falls  No falls noted this shift. Patient ambulates with x1 staff assistance without difficulty. Family member at bedside, spent the night. Bed kept in low position. Safe environment maintained. Bedside table & call light in reach. Uses call light appropriately when needing assistance. Comments: Care plan reviewed with patient and family who verbalize understanding of the plan of care and contribute to goal setting.

## 2018-03-02 NOTE — ED PROVIDER NOTES
are inherent in voice recognition technology. **      Final report electronically signed by Dr. Christiano Posada on 3/1/2018 10:06 PM      CT SOFT TISSUE NECK W CONTRAST   Final Result   1. There is an intramuscular abscess in the muscle or muscle groups medial to the right levatore scapulae. This collection appears to be multiloculated and contains at least 2 small gas bubbles. 2.  Increased density in the right epidural space from C2 down to the C5 level. This is worrisome for enhancement secondary to epidural inflammatory changes, or epidural collection. Recommend MRI of the cervical spine with IV contrast.         **This report has been created using voice recognition software. It may contain minor errors which are inherent in voice recognition technology. **      Final report electronically signed by Dr. Christiano Posada on 3/1/2018 9:59 PM      XR CHEST PORTABLE   Final Result      No acute cardiopulmonary disease. **This report has been created using voice recognition software. It may contain minor errors which are inherent in voice recognition technology. **      Final report electronically signed by Dr. Chasity Esparza on 3/1/2018 8:14 PM            LABS:     Labs Reviewed   BASIC METABOLIC PANEL - Abnormal; Notable for the following:        Result Value    Sodium 134 (*)     Chloride 95 (*)     Glucose 143 (*)     All other components within normal limits   CBC WITH AUTO DIFFERENTIAL - Abnormal; Notable for the following:     WBC 15.0 (*)     MPV 6.6 (*)     Segs Absolute 11.7 (*)     All other components within normal limits   LIPASE - Abnormal; Notable for the following:     Lipase 58.8 (*)     All other components within normal limits   OSMOLALITY - Abnormal; Notable for the following:     Osmolality Calc 274.0 (*)     All other components within normal limits   CBC WITH AUTO DIFFERENTIAL - Abnormal; Notable for the following:     WBC 13.9 (*)     MPV 6.6 (*)     Segs Absolute 10.3 (*)     All other components within normal limits   SEDIMENTATION RATE - Abnormal; Notable for the following:     Sed Rate 63 (*)     All other components within normal limits   C-REACTIVE PROTEIN - Abnormal; Notable for the following:     CRP 4.12 (*)     All other components within normal limits   CULTURE BLOOD #1    Narrative:     Source: blood-Adult-optimal 5.5-5.7oz/set volume       Site: Peripheral Vein            Current Antibiotics: not stated   CULTURE BLOOD #2    Narrative:     Source: blood-Adult-suboptimal <5.5oz./set volume       Site: Peripheral Vein            Current Antibiotics: not stated   ANAEROBIC AND AEROBIC CULTURE    Narrative:     Source: tissue       Site: cervical epidural abscess          Current Antibiotics: not   stated   FUNGUS CULTURE    Narrative:     Source: tissue       Site: cervical epidural abscess          Current Antibiotics: not   stated   AFB STAIN   MAGNESIUM   TROPONIN   ANION GAP   GLOMERULAR FILTRATION RATE, ESTIMATED   VANCOMYCIN, TROUGH   SURGICAL PATHOLOGY   VANCOMYCIN, TROUGH   CBC WITH AUTO DIFFERENTIAL   BASIC METABOLIC PANEL W/ REFLEX TO MG FOR LOW K        EMERGENCY DEPARTMENT COURSE:   Vitals:    Vitals:    03/03/18 1305 03/03/18 1320 03/03/18 1334 03/03/18 1527   BP: (!) 180/102 (!) 167/93 (!) 160/93 (!) 161/78   Pulse: 79 76 79 85   Resp:    18   Temp:    97.6 °F (36.4 °C)   TempSrc:    Oral   SpO2:   96% 94%   Weight:       Height:           7:18 PM: The patient was seen and evaluated. 7:34 PM: The patient was given Toradol and Flexeril. MDM:  The patient was seen for neck pain radiating down his right shoulder. The patient received Toradol and Flexeril while in the ED for relief. The lab results are reassuring. The imaging shows an intramuscular abscess. Dr. Tri Inman, neuro surgery was consulted. We discussed the results and he says to admit the patient to the hospitalist and he will consult the patient. He says he wants infectious disease to be consulted as well.

## 2018-03-02 NOTE — PROGRESS NOTES
Evaluation:   · Evaluation: Goals set   · Goals: Pt. will receive adequate nutrition (FL diet or greater) in next 1-4 days. · Monitoring: NPO Status, Diet Progression, Meal Intake, Supplement Intake, Diet Tolerance, Ascites/Edema, Weight, Pertinent Labs    See Adult Nutrition Doc Flowsheet for more detail.      Electronically signed by Harish Bartlett RD, LD on 3/2/18 at 1:16 PM    Contact Number: [phone]

## 2018-03-03 ENCOUNTER — APPOINTMENT (OUTPATIENT)
Dept: GENERAL RADIOLOGY | Age: 58
DRG: 023 | End: 2018-03-03
Payer: MEDICAID

## 2018-03-03 VITALS
SYSTOLIC BLOOD PRESSURE: 171 MMHG | DIASTOLIC BLOOD PRESSURE: 106 MMHG | RESPIRATION RATE: 1 BRPM | TEMPERATURE: 98.1 F | OXYGEN SATURATION: 100 %

## 2018-03-03 LAB — VANCOMYCIN TROUGH: 12.9 UG/ML (ref 5–15)

## 2018-03-03 PROCEDURE — 87070 CULTURE OTHR SPECIMN AEROBIC: CPT

## 2018-03-03 PROCEDURE — 87205 SMEAR GRAM STAIN: CPT

## 2018-03-03 PROCEDURE — 6360000002 HC RX W HCPCS: Performed by: NEUROLOGICAL SURGERY

## 2018-03-03 PROCEDURE — 2500000003 HC RX 250 WO HCPCS: Performed by: NEUROLOGICAL SURGERY

## 2018-03-03 PROCEDURE — 87102 FUNGUS ISOLATION CULTURE: CPT

## 2018-03-03 PROCEDURE — 87147 CULTURE TYPE IMMUNOLOGIC: CPT

## 2018-03-03 PROCEDURE — 36415 COLL VENOUS BLD VENIPUNCTURE: CPT

## 2018-03-03 PROCEDURE — 3700000000 HC ANESTHESIA ATTENDED CARE: Performed by: NEUROLOGICAL SURGERY

## 2018-03-03 PROCEDURE — 1200000000 HC SEMI PRIVATE

## 2018-03-03 PROCEDURE — 2580000003 HC RX 258: Performed by: INTERNAL MEDICINE

## 2018-03-03 PROCEDURE — 99232 SBSQ HOSP IP/OBS MODERATE 35: CPT | Performed by: INTERNAL MEDICINE

## 2018-03-03 PROCEDURE — 3600000004 HC SURGERY LEVEL 4 BASE: Performed by: NEUROLOGICAL SURGERY

## 2018-03-03 PROCEDURE — 2720000010 HC SURG SUPPLY STERILE: Performed by: NEUROLOGICAL SURGERY

## 2018-03-03 PROCEDURE — 2500000003 HC RX 250 WO HCPCS: Performed by: NURSE ANESTHETIST, CERTIFIED REGISTERED

## 2018-03-03 PROCEDURE — 72040 X-RAY EXAM NECK SPINE 2-3 VW: CPT

## 2018-03-03 PROCEDURE — 6360000002 HC RX W HCPCS: Performed by: INTERNAL MEDICINE

## 2018-03-03 PROCEDURE — 87206 SMEAR FLUORESCENT/ACID STAI: CPT

## 2018-03-03 PROCEDURE — 01N10ZZ RELEASE CERVICAL NERVE, OPEN APPROACH: ICD-10-PCS | Performed by: NEUROLOGICAL SURGERY

## 2018-03-03 PROCEDURE — 3209999900 FLUORO FOR SURGICAL PROCEDURES

## 2018-03-03 PROCEDURE — 7100000000 HC PACU RECOVERY - FIRST 15 MIN: Performed by: NEUROLOGICAL SURGERY

## 2018-03-03 PROCEDURE — 7100000001 HC PACU RECOVERY - ADDTL 15 MIN: Performed by: NEUROLOGICAL SURGERY

## 2018-03-03 PROCEDURE — 3700000001 HC ADD 15 MINUTES (ANESTHESIA): Performed by: NEUROLOGICAL SURGERY

## 2018-03-03 PROCEDURE — 6370000000 HC RX 637 (ALT 250 FOR IP): Performed by: NEUROLOGICAL SURGERY

## 2018-03-03 PROCEDURE — 2580000003 HC RX 258: Performed by: NEUROLOGICAL SURGERY

## 2018-03-03 PROCEDURE — 87075 CULTR BACTERIA EXCEPT BLOOD: CPT

## 2018-03-03 PROCEDURE — 6360000002 HC RX W HCPCS: Performed by: NURSE ANESTHETIST, CERTIFIED REGISTERED

## 2018-03-03 PROCEDURE — 87186 SC STD MICRODIL/AGAR DIL: CPT

## 2018-03-03 PROCEDURE — 2500000003 HC RX 250 WO HCPCS: Performed by: ANESTHESIOLOGY

## 2018-03-03 PROCEDURE — 00CW0ZZ EXTIRPATION OF MATTER FROM CERVICAL SPINAL CORD, OPEN APPROACH: ICD-10-PCS | Performed by: NEUROLOGICAL SURGERY

## 2018-03-03 PROCEDURE — 88304 TISSUE EXAM BY PATHOLOGIST: CPT

## 2018-03-03 PROCEDURE — 3600000014 HC SURGERY LEVEL 4 ADDTL 15MIN: Performed by: NEUROLOGICAL SURGERY

## 2018-03-03 PROCEDURE — 6360000002 HC RX W HCPCS: Performed by: ANESTHESIOLOGY

## 2018-03-03 PROCEDURE — 87077 CULTURE AEROBIC IDENTIFY: CPT

## 2018-03-03 PROCEDURE — 80202 ASSAY OF VANCOMYCIN: CPT

## 2018-03-03 RX ORDER — FENTANYL CITRATE 50 UG/ML
INJECTION, SOLUTION INTRAMUSCULAR; INTRAVENOUS PRN
Status: DISCONTINUED | OUTPATIENT
Start: 2018-03-03 | End: 2018-03-03 | Stop reason: SDUPTHER

## 2018-03-03 RX ORDER — ACETAMINOPHEN 650 MG/1
650 SUPPOSITORY RECTAL EVERY 4 HOURS PRN
Status: DISCONTINUED | OUTPATIENT
Start: 2018-03-03 | End: 2018-03-07 | Stop reason: HOSPADM

## 2018-03-03 RX ORDER — FENTANYL CITRATE 50 UG/ML
INJECTION, SOLUTION INTRAMUSCULAR; INTRAVENOUS
Status: DISCONTINUED
Start: 2018-03-03 | End: 2018-03-03

## 2018-03-03 RX ORDER — LIDOCAINE HYDROCHLORIDE 20 MG/ML
INJECTION, SOLUTION INFILTRATION; PERINEURAL PRN
Status: DISCONTINUED | OUTPATIENT
Start: 2018-03-03 | End: 2018-03-03 | Stop reason: SDUPTHER

## 2018-03-03 RX ORDER — FAMOTIDINE 20 MG/1
20 TABLET, FILM COATED ORAL 2 TIMES DAILY
Status: DISCONTINUED | OUTPATIENT
Start: 2018-03-03 | End: 2018-03-07 | Stop reason: HOSPADM

## 2018-03-03 RX ORDER — NEOSTIGMINE METHYLSULFATE 1 MG/ML
INJECTION, SOLUTION INTRAVENOUS PRN
Status: DISCONTINUED | OUTPATIENT
Start: 2018-03-03 | End: 2018-03-03 | Stop reason: SDUPTHER

## 2018-03-03 RX ORDER — ACETAMINOPHEN 325 MG/1
650 TABLET ORAL EVERY 4 HOURS PRN
Status: DISCONTINUED | OUTPATIENT
Start: 2018-03-03 | End: 2018-03-07 | Stop reason: HOSPADM

## 2018-03-03 RX ORDER — LABETALOL HYDROCHLORIDE 5 MG/ML
INJECTION, SOLUTION INTRAVENOUS
Status: DISPENSED
Start: 2018-03-03 | End: 2018-03-03

## 2018-03-03 RX ORDER — LABETALOL HYDROCHLORIDE 5 MG/ML
10 INJECTION, SOLUTION INTRAVENOUS EVERY 10 MIN PRN
Status: DISCONTINUED | OUTPATIENT
Start: 2018-03-03 | End: 2018-03-03 | Stop reason: HOSPADM

## 2018-03-03 RX ORDER — SODIUM CHLORIDE 9 MG/ML
INJECTION, SOLUTION INTRAVENOUS CONTINUOUS
Status: DISCONTINUED | OUTPATIENT
Start: 2018-03-03 | End: 2018-03-05

## 2018-03-03 RX ORDER — MIDAZOLAM HYDROCHLORIDE 1 MG/ML
INJECTION INTRAMUSCULAR; INTRAVENOUS PRN
Status: DISCONTINUED | OUTPATIENT
Start: 2018-03-03 | End: 2018-03-03 | Stop reason: SDUPTHER

## 2018-03-03 RX ORDER — ROCURONIUM BROMIDE 10 MG/ML
INJECTION, SOLUTION INTRAVENOUS PRN
Status: DISCONTINUED | OUTPATIENT
Start: 2018-03-03 | End: 2018-03-03 | Stop reason: SDUPTHER

## 2018-03-03 RX ORDER — OXYCODONE HYDROCHLORIDE 5 MG/1
10 TABLET ORAL EVERY 4 HOURS PRN
Status: DISCONTINUED | OUTPATIENT
Start: 2018-03-03 | End: 2018-03-07 | Stop reason: HOSPADM

## 2018-03-03 RX ORDER — PROPOFOL 10 MG/ML
INJECTION, EMULSION INTRAVENOUS PRN
Status: DISCONTINUED | OUTPATIENT
Start: 2018-03-03 | End: 2018-03-03 | Stop reason: SDUPTHER

## 2018-03-03 RX ORDER — BUPIVACAINE HYDROCHLORIDE AND EPINEPHRINE 5; 5 MG/ML; UG/ML
INJECTION, SOLUTION EPIDURAL; INTRACAUDAL; PERINEURAL PRN
Status: DISCONTINUED | OUTPATIENT
Start: 2018-03-03 | End: 2018-03-03 | Stop reason: HOSPADM

## 2018-03-03 RX ORDER — OXYCODONE HYDROCHLORIDE 5 MG/1
5 TABLET ORAL EVERY 4 HOURS PRN
Status: DISCONTINUED | OUTPATIENT
Start: 2018-03-03 | End: 2018-03-07 | Stop reason: HOSPADM

## 2018-03-03 RX ORDER — DOCUSATE SODIUM 100 MG/1
100 CAPSULE, LIQUID FILLED ORAL 2 TIMES DAILY
Status: DISCONTINUED | OUTPATIENT
Start: 2018-03-03 | End: 2018-03-07 | Stop reason: HOSPADM

## 2018-03-03 RX ORDER — GLYCOPYRROLATE 0.2 MG/ML
INJECTION INTRAMUSCULAR; INTRAVENOUS PRN
Status: DISCONTINUED | OUTPATIENT
Start: 2018-03-03 | End: 2018-03-03 | Stop reason: SDUPTHER

## 2018-03-03 RX ORDER — ONDANSETRON 2 MG/ML
INJECTION INTRAMUSCULAR; INTRAVENOUS PRN
Status: DISCONTINUED | OUTPATIENT
Start: 2018-03-03 | End: 2018-03-03 | Stop reason: SDUPTHER

## 2018-03-03 RX ORDER — DEXAMETHASONE SODIUM PHOSPHATE 4 MG/ML
INJECTION, SOLUTION INTRA-ARTICULAR; INTRALESIONAL; INTRAMUSCULAR; INTRAVENOUS; SOFT TISSUE PRN
Status: DISCONTINUED | OUTPATIENT
Start: 2018-03-03 | End: 2018-03-03 | Stop reason: SDUPTHER

## 2018-03-03 RX ORDER — METHOCARBAMOL 500 MG/1
750 TABLET, FILM COATED ORAL EVERY 6 HOURS PRN
Status: DISCONTINUED | OUTPATIENT
Start: 2018-03-03 | End: 2018-03-07 | Stop reason: HOSPADM

## 2018-03-03 RX ORDER — FENTANYL CITRATE 50 UG/ML
50 INJECTION, SOLUTION INTRAMUSCULAR; INTRAVENOUS EVERY 5 MIN PRN
Status: COMPLETED | OUTPATIENT
Start: 2018-03-03 | End: 2018-03-03

## 2018-03-03 RX ADMIN — HYDROMORPHONE HYDROCHLORIDE 0.5 MG: 1 INJECTION, SOLUTION INTRAMUSCULAR; INTRAVENOUS; SUBCUTANEOUS at 11:05

## 2018-03-03 RX ADMIN — FAMOTIDINE 20 MG: 20 TABLET, FILM COATED ORAL at 15:43

## 2018-03-03 RX ADMIN — OXYCODONE HYDROCHLORIDE 10 MG: 5 TABLET ORAL at 23:38

## 2018-03-03 RX ADMIN — DOCUSATE SODIUM 100 MG: 100 CAPSULE ORAL at 20:21

## 2018-03-03 RX ADMIN — FENTANYL CITRATE 50 MCG: 50 INJECTION INTRAMUSCULAR; INTRAVENOUS at 10:50

## 2018-03-03 RX ADMIN — GLYCOPYRROLATE 0.2 MG: 0.2 INJECTION, SOLUTION INTRAMUSCULAR; INTRAVENOUS at 08:03

## 2018-03-03 RX ADMIN — FENTANYL CITRATE 50 MCG: 50 INJECTION INTRAMUSCULAR; INTRAVENOUS at 08:40

## 2018-03-03 RX ADMIN — ONDANSETRON 4 MG: 2 INJECTION INTRAMUSCULAR; INTRAVENOUS at 09:40

## 2018-03-03 RX ADMIN — PROPOFOL 200 MG: 10 INJECTION, EMULSION INTRAVENOUS at 08:03

## 2018-03-03 RX ADMIN — FENTANYL CITRATE 50 MCG: 50 INJECTION INTRAMUSCULAR; INTRAVENOUS at 10:45

## 2018-03-03 RX ADMIN — HYDROMORPHONE HYDROCHLORIDE 1 MG: 1 INJECTION, SOLUTION INTRAMUSCULAR; INTRAVENOUS; SUBCUTANEOUS at 20:25

## 2018-03-03 RX ADMIN — DEXAMETHASONE SODIUM PHOSPHATE 10 MG: 4 INJECTION, SOLUTION INTRAMUSCULAR; INTRAVENOUS at 08:03

## 2018-03-03 RX ADMIN — SODIUM CHLORIDE: 9 INJECTION, SOLUTION INTRAVENOUS at 15:42

## 2018-03-03 RX ADMIN — NEOSTIGMINE METHYLSULFATE 3 MG: 1 INJECTION, SOLUTION INTRAVENOUS at 10:17

## 2018-03-03 RX ADMIN — DOCUSATE SODIUM 100 MG: 100 CAPSULE ORAL at 15:42

## 2018-03-03 RX ADMIN — VANCOMYCIN HYDROCHLORIDE 1500 MG: 10 INJECTION, POWDER, LYOPHILIZED, FOR SOLUTION INTRAVENOUS at 00:03

## 2018-03-03 RX ADMIN — HYDROMORPHONE HYDROCHLORIDE 1 MG: 1 INJECTION, SOLUTION INTRAMUSCULAR; INTRAVENOUS; SUBCUTANEOUS at 05:18

## 2018-03-03 RX ADMIN — MIDAZOLAM HYDROCHLORIDE 2 MG: 1 INJECTION, SOLUTION INTRAMUSCULAR; INTRAVENOUS at 07:52

## 2018-03-03 RX ADMIN — FENTANYL CITRATE 50 MCG: 50 INJECTION INTRAMUSCULAR; INTRAVENOUS at 10:40

## 2018-03-03 RX ADMIN — HYDROMORPHONE HYDROCHLORIDE 0.5 MG: 1 INJECTION, SOLUTION INTRAMUSCULAR; INTRAVENOUS; SUBCUTANEOUS at 11:15

## 2018-03-03 RX ADMIN — PROPOFOL 50 MG: 10 INJECTION, EMULSION INTRAVENOUS at 08:40

## 2018-03-03 RX ADMIN — Medication 50 MG: at 08:03

## 2018-03-03 RX ADMIN — CEFEPIME HYDROCHLORIDE 1 G: 1 INJECTION, POWDER, FOR SOLUTION INTRAMUSCULAR; INTRAVENOUS at 13:09

## 2018-03-03 RX ADMIN — FENTANYL CITRATE 50 MCG: 50 INJECTION INTRAMUSCULAR; INTRAVENOUS at 10:55

## 2018-03-03 RX ADMIN — Medication 20 MG: at 08:40

## 2018-03-03 RX ADMIN — HYDROMORPHONE HYDROCHLORIDE 1 MG: 1 INJECTION, SOLUTION INTRAMUSCULAR; INTRAVENOUS; SUBCUTANEOUS at 01:49

## 2018-03-03 RX ADMIN — SODIUM CHLORIDE: 9 INJECTION, SOLUTION INTRAVENOUS at 09:35

## 2018-03-03 RX ADMIN — FENTANYL CITRATE 100 MCG: 50 INJECTION INTRAMUSCULAR; INTRAVENOUS at 08:03

## 2018-03-03 RX ADMIN — VANCOMYCIN HYDROCHLORIDE 1500 MG: 10 INJECTION, POWDER, LYOPHILIZED, FOR SOLUTION INTRAVENOUS at 16:45

## 2018-03-03 RX ADMIN — HYDROMORPHONE HYDROCHLORIDE 1 MG: 1 INJECTION, SOLUTION INTRAMUSCULAR; INTRAVENOUS; SUBCUTANEOUS at 13:17

## 2018-03-03 RX ADMIN — PHENYLEPHRINE HYDROCHLORIDE 100 MCG: 10 INJECTION INTRAMUSCULAR; INTRAVENOUS; SUBCUTANEOUS at 08:25

## 2018-03-03 RX ADMIN — FAMOTIDINE 20 MG: 20 TABLET, FILM COATED ORAL at 20:21

## 2018-03-03 RX ADMIN — LABETALOL HYDROCHLORIDE 10 MG: 5 INJECTION INTRAVENOUS at 11:50

## 2018-03-03 RX ADMIN — LIDOCAINE HYDROCHLORIDE 80 MG: 20 INJECTION, SOLUTION INFILTRATION; PERINEURAL at 08:03

## 2018-03-03 RX ADMIN — Medication 10 ML: at 20:21

## 2018-03-03 RX ADMIN — HYDROMORPHONE HYDROCHLORIDE 1 MG: 1 INJECTION, SOLUTION INTRAMUSCULAR; INTRAVENOUS; SUBCUTANEOUS at 17:24

## 2018-03-03 RX ADMIN — HYDROMORPHONE HYDROCHLORIDE 0.5 MG: 1 INJECTION, SOLUTION INTRAMUSCULAR; INTRAVENOUS; SUBCUTANEOUS at 11:00

## 2018-03-03 RX ADMIN — GLYCOPYRROLATE 0.6 MG: 0.2 INJECTION, SOLUTION INTRAMUSCULAR; INTRAVENOUS at 10:17

## 2018-03-03 RX ADMIN — HYDROMORPHONE HYDROCHLORIDE 0.5 MG: 1 INJECTION, SOLUTION INTRAMUSCULAR; INTRAVENOUS; SUBCUTANEOUS at 11:10

## 2018-03-03 RX ADMIN — PHENYLEPHRINE HYDROCHLORIDE 200 MCG: 10 INJECTION INTRAMUSCULAR; INTRAVENOUS; SUBCUTANEOUS at 08:05

## 2018-03-03 RX ADMIN — OXYCODONE HYDROCHLORIDE 10 MG: 5 TABLET ORAL at 12:57

## 2018-03-03 ASSESSMENT — PULMONARY FUNCTION TESTS
PIF_VALUE: 19
PIF_VALUE: 18
PIF_VALUE: 19
PIF_VALUE: 18
PIF_VALUE: 20
PIF_VALUE: 18
PIF_VALUE: 19
PIF_VALUE: 18
PIF_VALUE: 16
PIF_VALUE: 14
PIF_VALUE: 19
PIF_VALUE: 17
PIF_VALUE: 17
PIF_VALUE: 20
PIF_VALUE: 1
PIF_VALUE: 19
PIF_VALUE: 18
PIF_VALUE: 19
PIF_VALUE: 18
PIF_VALUE: 4
PIF_VALUE: 17
PIF_VALUE: 19
PIF_VALUE: 15
PIF_VALUE: 0
PIF_VALUE: 18
PIF_VALUE: 18
PIF_VALUE: 5
PIF_VALUE: 18
PIF_VALUE: 18
PIF_VALUE: 19
PIF_VALUE: 20
PIF_VALUE: 18
PIF_VALUE: 19
PIF_VALUE: 18
PIF_VALUE: 18
PIF_VALUE: 20
PIF_VALUE: 18
PIF_VALUE: 27
PIF_VALUE: 18
PIF_VALUE: 2
PIF_VALUE: 13
PIF_VALUE: 18
PIF_VALUE: 15
PIF_VALUE: 18
PIF_VALUE: 5
PIF_VALUE: 18
PIF_VALUE: 28
PIF_VALUE: 18
PIF_VALUE: 18
PIF_VALUE: 17
PIF_VALUE: 18
PIF_VALUE: 25
PIF_VALUE: 18
PIF_VALUE: 24
PIF_VALUE: 18
PIF_VALUE: 19
PIF_VALUE: 18
PIF_VALUE: 18
PIF_VALUE: 10
PIF_VALUE: 18
PIF_VALUE: 17
PIF_VALUE: 18
PIF_VALUE: 18
PIF_VALUE: 2
PIF_VALUE: 17
PIF_VALUE: 18
PIF_VALUE: 1
PIF_VALUE: 5
PIF_VALUE: 18
PIF_VALUE: 0
PIF_VALUE: 18
PIF_VALUE: 22
PIF_VALUE: 18
PIF_VALUE: 18
PIF_VALUE: 19
PIF_VALUE: 18
PIF_VALUE: 18
PIF_VALUE: 1
PIF_VALUE: 18
PIF_VALUE: 1
PIF_VALUE: 18
PIF_VALUE: 18
PIF_VALUE: 16
PIF_VALUE: 9
PIF_VALUE: 27
PIF_VALUE: 19
PIF_VALUE: 18
PIF_VALUE: 19
PIF_VALUE: 18
PIF_VALUE: 15
PIF_VALUE: 18
PIF_VALUE: 2
PIF_VALUE: 18
PIF_VALUE: 19
PIF_VALUE: 18
PIF_VALUE: 10
PIF_VALUE: 18
PIF_VALUE: 1
PIF_VALUE: 19
PIF_VALUE: 18
PIF_VALUE: 15
PIF_VALUE: 17
PIF_VALUE: 18
PIF_VALUE: 18

## 2018-03-03 ASSESSMENT — ENCOUNTER SYMPTOMS
VOMITING: 0
SHORTNESS OF BREATH: 0
VOICE CHANGE: 0
STRIDOR: 0
SINUS PAIN: 0
EYE ITCHING: 0
CONSTIPATION: 0
CHOKING: 0
NAUSEA: 0
ANAL BLEEDING: 0
ABDOMINAL DISTENTION: 0
WHEEZING: 0
EYE REDNESS: 0
APNEA: 0
COLOR CHANGE: 0
FACIAL SWELLING: 0
RECTAL PAIN: 0

## 2018-03-03 ASSESSMENT — PAIN SCALES - GENERAL
PAINLEVEL_OUTOF10: 6
PAINLEVEL_OUTOF10: 10
PAINLEVEL_OUTOF10: 6
PAINLEVEL_OUTOF10: 9
PAINLEVEL_OUTOF10: 10
PAINLEVEL_OUTOF10: 8
PAINLEVEL_OUTOF10: 10
PAINLEVEL_OUTOF10: 9
PAINLEVEL_OUTOF10: 10
PAINLEVEL_OUTOF10: 10
PAINLEVEL_OUTOF10: 6
PAINLEVEL_OUTOF10: 10
PAINLEVEL_OUTOF10: 8
PAINLEVEL_OUTOF10: 10
PAINLEVEL_OUTOF10: 5

## 2018-03-03 ASSESSMENT — PAIN DESCRIPTION - ORIENTATION
ORIENTATION: POSTERIOR

## 2018-03-03 ASSESSMENT — PAIN DESCRIPTION - DESCRIPTORS
DESCRIPTORS: DISCOMFORT
DESCRIPTORS: ACHING;BURNING;SHARP
DESCRIPTORS: ACHING
DESCRIPTORS: ACHING

## 2018-03-03 ASSESSMENT — PAIN DESCRIPTION - PROGRESSION
CLINICAL_PROGRESSION: GRADUALLY IMPROVING
CLINICAL_PROGRESSION: GRADUALLY WORSENING
CLINICAL_PROGRESSION: GRADUALLY WORSENING

## 2018-03-03 ASSESSMENT — PAIN DESCRIPTION - PAIN TYPE
TYPE: SURGICAL PAIN

## 2018-03-03 ASSESSMENT — PAIN DESCRIPTION - FREQUENCY
FREQUENCY: CONTINUOUS
FREQUENCY: CONTINUOUS

## 2018-03-03 ASSESSMENT — PAIN DESCRIPTION - LOCATION
LOCATION: NECK

## 2018-03-03 ASSESSMENT — PAIN DESCRIPTION - ONSET
ONSET: ON-GOING
ONSET: ON-GOING

## 2018-03-03 NOTE — PROGRESS NOTES
Progress note: Infectious diseases    Patient - Ulysses Rides,  Age - 59 y.o.    - 1960      Room Number - 7K-06/006-A   MRN -  [id number]   Acct # - [de-identified]  Date of Admission -  3/1/2018  6:51 PM    SUBJECTIVE:   He had surgery. Preliminary gram stain is showing gram positive cocci in pairs  OBJECTIVE   VITALS    height is 6' 3\" (1.905 m) and weight is 202 lb 2 oz (91.7 kg). His oral temperature is 97.6 °F (36.4 °C). His blood pressure is 161/78 (abnormal) and his pulse is 85. His respiration is 18 and oxygen saturation is 94%.        Wt Readings from Last 3 Encounters:   18 202 lb 2 oz (91.7 kg)   18 215 lb (97.5 kg)       I/O (24 Hours)    Intake/Output Summary (Last 24 hours) at 18 1535  Last data filed at 18 1204   Gross per 24 hour   Intake          3552.57 ml   Output              250 ml   Net          3302.57 ml       General Appearance  Awake, alert, oriented,  not  In acute distress  HEENT - normocephalic, atraumatic, pink conjunctiva,  anicteric sclera  Neck - Supple, dressed posterior neck wound, hemovac in place  Lungs -  Bilateral good air entry, no rhonchi, no wheeze  Cardiovascular - Heart sounds are normal.    Abdomen - soft, not distended, nontender,   Neurologic -oriented  Skin - No bruising or bleeding  Extremities - limited range of movement on the right upper arm    MEDICATIONS:      docusate sodium  100 mg Oral BID    famotidine  20 mg Oral BID    [START ON 3/5/2018] enoxaparin  40 mg Subcutaneous Daily    labetalol        vancomycin  1,500 mg Intravenous Q12H    sodium chloride flush  10 mL Intravenous 2 times per day    cefepime  1 g Intravenous Q12H      sodium chloride       acetaminophen, acetaminophen, oxyCODONE **OR** oxyCODONE, methocarbamol, sodium chloride flush, ondansetron, HYDROmorphone **OR** HYDROmorphone      LABS:     CBC:   Recent Labs 03/01/18 1933 03/02/18   0637   WBC  15.0*  13.9*   HGB  14.9  14.9   PLT  396  380     BMP:  Recent Labs      03/01/18 1933   NA  134*   K  4.1   CL  95*   CO2  23   BUN  22   CREATININE  0.8   GLUCOSE  143*     Calcium:  Recent Labs      03/01/18 1933   CALCIUM  9.6     Ionized Calcium:No results for input(s): IONCA in the last 72 hours. Magnesium:  Recent Labs      03/01/18 1933   MG  2.0     Phosphorus:No results for input(s): PHOS in the last 72 hours. BNP:No results for input(s): BNP in the last 72 hours. Glucose:No results for input(s): POCGLU in the last 72 hours. HgbA1C: No results for input(s): LABA1C in the last 72 hours. INR: No results for input(s): INR in the last 72 hours. Hepatic: No results for input(s): ALKPHOS, ALT, AST, PROT, BILITOT, BILIDIR, LABALBU in the last 72 hours. Amylase and Lipase:No results for input(s): LACTA, AMYLASE in the last 72 hours. Lactic Acid: No results for input(s): LACTA in the last 72 hours. Troponin: No results for input(s): CKTOTAL, CKMB, TROPONINI in the last 72 hours. BNP: No results for input(s): BNP in the last 72 hours. CULTURES:   UA: No results for input(s): SPECGRAV, PHUR, COLORU, CLARITYU, MUCUS, PROTEINU, BLOODU, RBCUA, WBCUA, BACTERIA, NITRU, GLUCOSEU, BILIRUBINUR, UROBILINOGEN, KETUA, LABCAST, LABCASTTY, AMORPHOS in the last 72 hours.     Invalid input(s): CRYSTALS  Micro:   Lab Results   Component Value Date    BC No growth-preliminary 03/01/2018         IMAGING:         Problem list of patient:     Patient Active Problem List   Diagnosis Code    Paraspinal abscess ([facility] [address] M46.20         ASSESSMENT/PLAN   parspinal and Epidural abscess: he had surgery  Continue current antibiotic  Discussed on the need for long term iv antibiotic      Jack Glasgow MD, [facility] 3/3/2018 3:35 PM

## 2018-03-03 NOTE — ANESTHESIA PRE PROCEDURE
Department of Anesthesiology  Preprocedure Note       Name:  Lorenzo Chou   Age:  59 y.o.  :  1960                                          MRN:  [id number]         Date:  3/3/2018      Surgeon: Salvador Vasquez):  Aime Landin MD    Procedure: Procedure(s):  POSTERIOR BILATERAL C3-6 LAMINECTOMY FOR DRAINAGE OF ABSCESS    Medications prior to admission:   Prior to Admission medications    Medication Sig Start Date End Date Taking? Authorizing Provider   aspirin 325 MG tablet Take 325 mg by mouth daily   Yes Historical Provider, MD   diazepam (VALIUM) 5 MG tablet Take 1 tablet by mouth every 8 hours as needed for Anxiety for up to 10 doses.  2/22/18 3/4/18 Yes Jean Pierre Bhatti MD   ibuprofen (ADVIL;MOTRIN) 800 MG tablet Take 1 tablet by mouth every 8 hours as needed for Pain 18  Yes Jean Pierre Bhatti MD       Current medications:    Current Facility-Administered Medications   Medication Dose Route Frequency Provider Last Rate Last Dose    diazepam (VALIUM) tablet 5 mg  5 mg Oral Q8H PRN Reg Pierce MD   5 mg at 18 0819    vancomycin (VANCOCIN) 1,500 mg in dextrose 5 % 500 mL IVPB  1,500 mg Intravenous Q12H Reg Pierce MD   Stopped at 18 0203    0.9 % sodium chloride infusion   Intravenous Continuous Reg Pierce MD 50 mL/hr at 18 0329      sodium chloride flush 0.9 % injection 10 mL  10 mL Intravenous 2 times per day Reg Pierce MD        sodium chloride flush 0.9 % injection 10 mL  10 mL Intravenous PRN Reg Pierce MD        acetaminophen (TYLENOL) tablet 650 mg  650 mg Oral Q4H PRN Reg Pierce MD   650 mg at 18 7349    docusate sodium (COLACE) capsule 100 mg  100 mg Oral BID Reg Pierce MD        ondansetron [facility]) injection 4 mg  4 mg Intravenous Q6H PRN Reg Pierce MD        enoxaparin (LOVENOX) injection 40 mg  40 mg Subcutaneous Daily Reg Pierce MD        influenza quadrivalent split vaccine (FLUZONE;FLUARIX;FLULAVAL;AFLURIA) injection 0.5 mL  0.5 mL Intramuscular Once Santo Valdez MD        cefepime (MAXIPIME) 1 g IVPB minibag  1 g Intravenous Q12H Tina Ortiz MD   Stopped at 03/02/18 2341    HYDROmorphone (DILAUDID) injection 0.5 mg  0.5 mg Intravenous Q3H PRN Ric Foster MD        Or   [facility] HYDROmorphone (DILAUDID) injection 1 mg  1 mg Intravenous Q3H PRN Ric Foster MD   1 mg at 03/03/18 0518    sodium chloride flush 0.9 % injection 10 mL  10 mL Intravenous 2 times per day Ric Foster MD        sodium chloride flush 0.9 % injection 10 mL  10 mL Intravenous PRN Ric Foster MD           Allergies:  No Known Allergies    Problem List:    Patient Active Problem List   Diagnosis Code    Paraspinal abscess ([facility] [address] M46.20       Past Medical History:        Diagnosis Date    CAD (coronary artery disease)     CHF (congestive heart failure) ([facility] [address]     Diabetes mellitus ([facility] [address]        Past Surgical History:        Procedure Laterality Date    CARDIAC SURGERY         Social History:    Social History   Substance Use Topics    Smoking status: Never Smoker    Smokeless tobacco: Never Used    Alcohol use No                                Counseling given: Not Answered      Vital Signs (Current):   Vitals:    03/02/18 0800 03/02/18 1530 03/02/18 2145 03/03/18 0512   BP: (!) 158/89 133/66 (!) 147/79 (!) 167/87   Pulse: 73 66 78 79   Resp: 16 16 16 16   Temp: 37.1 °C (98.7 °F) 36.6 °C (97.8 °F) 36.8 °C (98.2 °F) 37.3 °C (99.1 °F)   TempSrc: Oral Oral Oral Oral   SpO2: 94% 94% 93% 95%   Weight:       Height:                                                  BP Readings from Last 3 Encounters:   03/03/18 (!) 167/87   02/22/18 (!) 140/87       NPO Status:  > 8hrs                                                                               BMI:   Wt Readings from Last 3 Encounters:   03/02/18 202 lb 2 oz (91.7 kg)   02/22/18 215 lb (97.5 kg)     Body mass index is 25.26 kg/m².     CBC:   Lab Results   Component Value Date    WBC 13.9 03/02/2018    RBC 4.89 03/02/2018    HGB 14.9 03/02/2018    HCT 45.3 03/02/2018    MCV 92.5 03/02/2018    RDW 13.2 03/02/2018     03/02/2018       CMP:   Lab Results   Component Value Date     03/01/2018    K 4.1 03/01/2018    CL 95 03/01/2018    CO2 23 03/01/2018    BUN 22 03/01/2018    CREATININE 0.8 03/01/2018    LABGLOM >90 03/01/2018    GLUCOSE 143 03/01/2018    PROT 7.4 02/22/2018    CALCIUM 9.6 03/01/2018    BILITOT 0.4 02/22/2018    ALKPHOS 81 02/22/2018    AST 11 02/22/2018    ALT 9 02/22/2018       POC Tests: No results for input(s): POCGLU, POCNA, POCK, POCCL, POCBUN, POCHEMO, POCHCT in the last 72 hours. Coags:   Lab Results   Component Value Date    INR 1.09 02/22/2018    APTT 26.1 02/22/2018       HCG (If Applicable): No results found for: PREGTESTUR, PREGSERUM, HCG, HCGQUANT     ABGs: No results found for: PHART, PO2ART, UZH5VTX, INB9NPX, BEART, Y3VCTHIV     Type & Screen (If Applicable):  No results found for: [facility]    Anesthesia Evaluation  Patient summary reviewed and Nursing notes reviewed no history of anesthetic complications:   Airway: Mallampati: III  TM distance: <3 FB   Neck ROM: limited  Mouth opening: > = 3 FB Dental:          Pulmonary:normal exam  breath sounds clear to auscultation                            ROS comment: Marijuana; Times per week: 7   Cardiovascular:    (+) CAD:, CHF:,       ECG reviewed  Rhythm: regular  Rate: normal                    Neuro/Psych:                ROS comment: Paraspinal abscess GI/Hepatic/Renal: Neg GI/Hepatic/Renal ROS            Endo/Other:    (+) DiabetesType II DM, , .                 Abdominal:       Abdomen: soft. Vascular: negative vascular ROS. Anesthesia Plan      general     ASA 3       Induction: intravenous. BIS  MIPS: Postoperative opioids intended and Prophylactic antiemetics administered.   Anesthetic plan and risks discussed with

## 2018-03-03 NOTE — PLAN OF CARE
Problem: Pain:  Goal: Pain level will decrease  Pain level will decrease   Outcome: Ongoing  Patient pain goal 5/10. Patient using prn pain medication when available. White board updated. Problem: Neurological  Goal: Maximum potential motor/sensory/cognitive function  Outcome: Ongoing  Patient alert and oriented. Denies n/t to extremities. Problem: Cardiovascular  Goal: No DVT, peripheral vascular complications  Outcome: Ongoing  No s/s of DVT noted this shift. Problem: Respiratory  Goal: O2 Sat > 90%  Outcome: Ongoing  Patient remains above 90% room air. Denies shortness of breath. Lung sounds clear    Problem: GI  Goal: No bowel complications  Outcome: Ongoing  No bm this shift. Bowel sounds hypoactive. Problem:   Goal: Adequate urinary output  Outcome: Ongoing  Hernández catheter in place. Problem: Nutrition  Goal: Optimal nutrition therapy  Outcome: Ongoing  Patient tolerating general diet. Patient denies nausea    Problem: Skin Integrity/Risk  Goal: No skin breakdown during hospitalization  Outcome: Ongoing  No skin breakdown noted this shift. Surgical dressing to posterior neck clean dry and intact    Problem: Musculor/Skeletal Functional Status  Goal: Absence of falls  Outcome: Ongoing  Patient remains free from falls this shift. Bed alarm on and call light with in reach. Hourly rounding performed. Problem: Falls - Risk of  Goal: Absence of falls  Outcome: Ongoing  Patient remains free from falls this shift. Bed alarm on and call light with in reach. Hourly rounding performed. Comments: Care plan reviewed with patient. Patient verbalizes an understanding of plan of care and contributes to goal setting.

## 2018-03-04 PROBLEM — B95.8 STAPHYLOCOCCUS INFECTION: Status: ACTIVE | Noted: 2018-03-04

## 2018-03-04 LAB
AFB SMEAR: NORMAL
ANION GAP SERPL CALCULATED.3IONS-SCNC: 10 MEQ/L (ref 8–16)
BASOPHILS # BLD: 0.4 %
BASOPHILS ABSOLUTE: 0.1 THOU/MM3 (ref 0–0.1)
BUN BLDV-MCNC: 18 MG/DL (ref 7–22)
CALCIUM SERPL-MCNC: 9.1 MG/DL (ref 8.5–10.5)
CHLORIDE BLD-SCNC: 99 MEQ/L (ref 98–111)
CO2: 26 MEQ/L (ref 23–33)
CREAT SERPL-MCNC: 0.8 MG/DL (ref 0.4–1.2)
EOSINOPHIL # BLD: 0.4 %
EOSINOPHILS ABSOLUTE: 0.1 THOU/MM3 (ref 0–0.4)
GFR SERPL CREATININE-BSD FRML MDRD: > 90 ML/MIN/1.73M2
GLUCOSE BLD-MCNC: 125 MG/DL (ref 70–108)
HCT VFR BLD CALC: 39.6 % (ref 42–52)
HEMOGLOBIN: 13.3 GM/DL (ref 14–18)
LYMPHOCYTES # BLD: 15.1 %
LYMPHOCYTES ABSOLUTE: 2.6 THOU/MM3 (ref 1–4.8)
MCH RBC QN AUTO: 31.1 PG (ref 27–31)
MCHC RBC AUTO-ENTMCNC: 33.7 GM/DL (ref 33–37)
MCV RBC AUTO: 92.4 FL (ref 80–94)
MONOCYTES # BLD: 6.8 %
MONOCYTES ABSOLUTE: 1.2 THOU/MM3 (ref 0.4–1.3)
NUCLEATED RED BLOOD CELLS: 0 /100 WBC
PDW BLD-RTO: 12.9 % (ref 11.5–14.5)
PLATELET # BLD: 326 THOU/MM3 (ref 130–400)
PMV BLD AUTO: 6.9 FL (ref 7.4–10.4)
POTASSIUM REFLEX MAGNESIUM: 4.7 MEQ/L (ref 3.5–5.2)
RBC # BLD: 4.29 MILL/MM3 (ref 4.7–6.1)
SEG NEUTROPHILS: 77.3 %
SEGMENTED NEUTROPHILS ABSOLUTE COUNT: 13.4 THOU/MM3 (ref 1.8–7.7)
SODIUM BLD-SCNC: 135 MEQ/L (ref 135–145)
VANCOMYCIN TROUGH: 12.6 UG/ML (ref 5–15)
WBC # BLD: 17.3 THOU/MM3 (ref 4.8–10.8)

## 2018-03-04 PROCEDURE — 2580000003 HC RX 258: Performed by: INTERNAL MEDICINE

## 2018-03-04 PROCEDURE — 85025 COMPLETE CBC W/AUTO DIFF WBC: CPT

## 2018-03-04 PROCEDURE — 80202 ASSAY OF VANCOMYCIN: CPT

## 2018-03-04 PROCEDURE — 97161 PT EVAL LOW COMPLEX 20 MIN: CPT

## 2018-03-04 PROCEDURE — G8978 MOBILITY CURRENT STATUS: HCPCS

## 2018-03-04 PROCEDURE — 36415 COLL VENOUS BLD VENIPUNCTURE: CPT

## 2018-03-04 PROCEDURE — 97530 THERAPEUTIC ACTIVITIES: CPT

## 2018-03-04 PROCEDURE — 1200000000 HC SEMI PRIVATE

## 2018-03-04 PROCEDURE — G8979 MOBILITY GOAL STATUS: HCPCS

## 2018-03-04 PROCEDURE — G8987 SELF CARE CURRENT STATUS: HCPCS

## 2018-03-04 PROCEDURE — 6360000002 HC RX W HCPCS: Performed by: INTERNAL MEDICINE

## 2018-03-04 PROCEDURE — G8988 SELF CARE GOAL STATUS: HCPCS

## 2018-03-04 PROCEDURE — 80048 BASIC METABOLIC PNL TOTAL CA: CPT

## 2018-03-04 PROCEDURE — 99232 SBSQ HOSP IP/OBS MODERATE 35: CPT | Performed by: INTERNAL MEDICINE

## 2018-03-04 PROCEDURE — 97110 THERAPEUTIC EXERCISES: CPT

## 2018-03-04 PROCEDURE — G0009 ADMIN PNEUMOCOCCAL VACCINE: HCPCS | Performed by: INTERNAL MEDICINE

## 2018-03-04 PROCEDURE — 90670 PCV13 VACCINE IM: CPT | Performed by: INTERNAL MEDICINE

## 2018-03-04 PROCEDURE — 6370000000 HC RX 637 (ALT 250 FOR IP): Performed by: NEUROLOGICAL SURGERY

## 2018-03-04 PROCEDURE — 2580000003 HC RX 258: Performed by: NEUROLOGICAL SURGERY

## 2018-03-04 PROCEDURE — 6360000002 HC RX W HCPCS: Performed by: NEUROLOGICAL SURGERY

## 2018-03-04 PROCEDURE — 97166 OT EVAL MOD COMPLEX 45 MIN: CPT

## 2018-03-04 RX ADMIN — HYDROMORPHONE HYDROCHLORIDE 1 MG: 1 INJECTION, SOLUTION INTRAMUSCULAR; INTRAVENOUS; SUBCUTANEOUS at 03:33

## 2018-03-04 RX ADMIN — HYDROMORPHONE HYDROCHLORIDE 1 MG: 1 INJECTION, SOLUTION INTRAMUSCULAR; INTRAVENOUS; SUBCUTANEOUS at 15:33

## 2018-03-04 RX ADMIN — OXYCODONE HYDROCHLORIDE 10 MG: 5 TABLET ORAL at 04:25

## 2018-03-04 RX ADMIN — CEFEPIME HYDROCHLORIDE 1 G: 1 INJECTION, POWDER, FOR SOLUTION INTRAMUSCULAR; INTRAVENOUS at 00:33

## 2018-03-04 RX ADMIN — HYDROMORPHONE HYDROCHLORIDE 1 MG: 1 INJECTION, SOLUTION INTRAMUSCULAR; INTRAVENOUS; SUBCUTANEOUS at 07:49

## 2018-03-04 RX ADMIN — OXYCODONE HYDROCHLORIDE 10 MG: 5 TABLET ORAL at 20:22

## 2018-03-04 RX ADMIN — VANCOMYCIN HYDROCHLORIDE 1500 MG: 10 INJECTION, POWDER, LYOPHILIZED, FOR SOLUTION INTRAVENOUS at 04:18

## 2018-03-04 RX ADMIN — METHOCARBAMOL 750 MG: 500 TABLET ORAL at 14:52

## 2018-03-04 RX ADMIN — METHOCARBAMOL 750 MG: 500 TABLET ORAL at 01:52

## 2018-03-04 RX ADMIN — CEFAZOLIN SODIUM 2 G: 2 SOLUTION INTRAVENOUS at 12:10

## 2018-03-04 RX ADMIN — PNEUMOCOCCAL 13-VALENT CONJUGATE VACCINE 0.5 ML: 2.2; 2.2; 2.2; 2.2; 2.2; 4.4; 2.2; 2.2; 2.2; 2.2; 2.2; 2.2; 2.2 INJECTION, SUSPENSION INTRAMUSCULAR at 08:55

## 2018-03-04 RX ADMIN — HYDROMORPHONE HYDROCHLORIDE 0.5 MG: 1 INJECTION, SOLUTION INTRAMUSCULAR; INTRAVENOUS; SUBCUTANEOUS at 12:11

## 2018-03-04 RX ADMIN — FAMOTIDINE 20 MG: 20 TABLET, FILM COATED ORAL at 08:06

## 2018-03-04 RX ADMIN — DOCUSATE SODIUM 100 MG: 100 CAPSULE ORAL at 08:06

## 2018-03-04 RX ADMIN — VANCOMYCIN HYDROCHLORIDE 1500 MG: 10 INJECTION, POWDER, LYOPHILIZED, FOR SOLUTION INTRAVENOUS at 15:35

## 2018-03-04 RX ADMIN — HYDROMORPHONE HYDROCHLORIDE 1 MG: 1 INJECTION, SOLUTION INTRAMUSCULAR; INTRAVENOUS; SUBCUTANEOUS at 00:32

## 2018-03-04 RX ADMIN — HYDROMORPHONE HYDROCHLORIDE 1 MG: 1 INJECTION, SOLUTION INTRAMUSCULAR; INTRAVENOUS; SUBCUTANEOUS at 21:43

## 2018-03-04 RX ADMIN — FAMOTIDINE 20 MG: 20 TABLET, FILM COATED ORAL at 20:22

## 2018-03-04 RX ADMIN — CEFAZOLIN SODIUM 2 G: 2 SOLUTION INTRAVENOUS at 20:13

## 2018-03-04 RX ADMIN — DOCUSATE SODIUM 100 MG: 100 CAPSULE ORAL at 20:22

## 2018-03-04 RX ADMIN — HYDROMORPHONE HYDROCHLORIDE 1 MG: 1 INJECTION, SOLUTION INTRAMUSCULAR; INTRAVENOUS; SUBCUTANEOUS at 18:43

## 2018-03-04 RX ADMIN — SODIUM CHLORIDE: 9 INJECTION, SOLUTION INTRAVENOUS at 08:23

## 2018-03-04 RX ADMIN — METHOCARBAMOL 750 MG: 500 TABLET ORAL at 21:41

## 2018-03-04 RX ADMIN — OXYCODONE HYDROCHLORIDE 10 MG: 5 TABLET ORAL at 14:34

## 2018-03-04 RX ADMIN — OXYCODONE HYDROCHLORIDE 10 MG: 5 TABLET ORAL at 10:40

## 2018-03-04 ASSESSMENT — PAIN DESCRIPTION - PAIN TYPE
TYPE: SURGICAL PAIN

## 2018-03-04 ASSESSMENT — PAIN DESCRIPTION - PROGRESSION
CLINICAL_PROGRESSION: NOT CHANGED
CLINICAL_PROGRESSION: NOT CHANGED

## 2018-03-04 ASSESSMENT — PAIN SCALES - GENERAL
PAINLEVEL_OUTOF10: 9
PAINLEVEL_OUTOF10: 9
PAINLEVEL_OUTOF10: 10
PAINLEVEL_OUTOF10: 8
PAINLEVEL_OUTOF10: 9
PAINLEVEL_OUTOF10: 9
PAINLEVEL_OUTOF10: 6
PAINLEVEL_OUTOF10: 10
PAINLEVEL_OUTOF10: 9
PAINLEVEL_OUTOF10: 7
PAINLEVEL_OUTOF10: 8
PAINLEVEL_OUTOF10: 8
PAINLEVEL_OUTOF10: 10
PAINLEVEL_OUTOF10: 6
PAINLEVEL_OUTOF10: 10
PAINLEVEL_OUTOF10: 8

## 2018-03-04 ASSESSMENT — PAIN DESCRIPTION - FREQUENCY
FREQUENCY: CONTINUOUS

## 2018-03-04 ASSESSMENT — ENCOUNTER SYMPTOMS
CHOKING: 0
STRIDOR: 0
NAUSEA: 0
FACIAL SWELLING: 0
VOICE CHANGE: 0
ANAL BLEEDING: 0
APNEA: 0
SHORTNESS OF BREATH: 0
VOMITING: 0
SINUS PAIN: 0
COLOR CHANGE: 0
RECTAL PAIN: 0
EYE ITCHING: 0
CONSTIPATION: 0
ABDOMINAL DISTENTION: 0
WHEEZING: 0
EYE REDNESS: 0

## 2018-03-04 ASSESSMENT — PAIN DESCRIPTION - ORIENTATION
ORIENTATION: POSTERIOR

## 2018-03-04 ASSESSMENT — PAIN DESCRIPTION - LOCATION
LOCATION: NECK

## 2018-03-04 ASSESSMENT — PAIN DESCRIPTION - DESCRIPTORS
DESCRIPTORS: ACHING

## 2018-03-04 ASSESSMENT — PAIN DESCRIPTION - ONSET
ONSET: ON-GOING
ONSET: ON-GOING

## 2018-03-04 ASSESSMENT — PAIN DESCRIPTION - DIRECTION: RADIATING_TOWARDS: BACK

## 2018-03-04 NOTE — PROGRESS NOTES
Hospitalist Progress Note  [facility]       Patient: Olena Lynn  Unit/Bed: 7K-06/006-A  YOB: 1960  MRN: [id number]  Acct: [de-identified]   Admitting Diagnosis: Paraspinal abscess [facility]) [M46.20]  Admit Date:  3/1/2018  Hospital Day: 2    Subjective:    Patient is having problems with right neck pain for 2 weeks. C spine epidural abscess on imaging. Had a right tender neck swelling 2 weeks. The swelling has resolved and  was unable to right lateral turn his neck. H/o subjective  Fevers. S/P POSTERIOR BILATERAL C3-6 LAMINECTOMY FOR DRAINAGE OF ABSCESS-3. 3.2018. Patient Seen, Chart, Labs, Radiology studies, and Consults reviewed. Objective:   BP (!) 161/78   Pulse 85   Temp 97.6 °F (36.4 °C) (Oral)   Resp 18   Ht 6' 3\" (1.905 m)   Wt 202 lb 2 oz (91.7 kg)   SpO2 94%   BMI 25.26 kg/m²       Intake/Output Summary (Last 24 hours) at 03/03/18 1938  Last data filed at 03/03/18 1624   Gross per 24 hour   Intake          4209.22 ml   Output             1000 ml   Net          3209.22 ml     Review of Systems   Constitutional: Negative for activity change, chills, fatigue and unexpected weight change. HENT: Negative for congestion, ear discharge, facial swelling, nosebleeds, sinus pain, sneezing, tinnitus and voice change. Eyes: Negative for redness, itching and visual disturbance. Respiratory: Negative for apnea, choking, shortness of breath, wheezing and stridor. Cardiovascular: Negative for chest pain, palpitations and leg swelling. Gastrointestinal: Negative for abdominal distention, anal bleeding, constipation, nausea, rectal pain and vomiting. Endocrine: Negative for heat intolerance, polyphagia and polyuria. Genitourinary: Negative for decreased urine volume, difficulty urinating, discharge, enuresis, frequency, genital sores, hematuria, penile swelling, scrotal swelling and testicular pain. Musculoskeletal: Positive for neck pain.  Negative for arthralgias,

## 2018-03-04 NOTE — PROGRESS NOTES
Pharmacy Vancomycin Consult     Vancomycin Day: 3  Current Dosing: Vancomycin 1500 mg IV every 12 hours     Temp max:  98.2    Recent Labs      03/01/18   1933  03/04/18   0410   BUN  22  18       Recent Labs      03/01/18   1933  03/04/18   0410   CREATININE  0.8  0.8       Recent Labs      03/02/18   0637  03/04/18   0410   WBC  13.9*  17.3*       Culture Date Source Results   3/1/18 Blood X 2 pending    3/3/18  Cervical Abscess Staph Coag +                  Ht Readings from Last 1 Encounters:   03/02/18 6' 3\" (1.905 m)        Wt Readings from Last 1 Encounters:   03/04/18 202 lb 1.6 oz (91.7 kg)         Body mass index is 25.26 kg/m². CrCl: > 100 mL/min    Trough: 12.6    Assessment/Plan: Continue current dosing. Timing of past doses where not given on consistent basis. Suggest repeat trough level once doses are given on a more consistent basis. WBC have increased. Renal function is stable. Cervical abscess growing staph coag + prelim.

## 2018-03-04 NOTE — PLAN OF CARE
Problem: Pain:  Goal: Pain level will decrease  Pain level will decrease    Outcome: Ongoing  Pt report pain at 10 on scale. Pt states oral and IV medication helping to achieve pain goal of a 5 on scale. Problem: Neurological  Goal: Maximum potential motor/sensory/cognitive function  Outcome: Ongoing  Pt has full sensation on BLE and BUE. Strong bilateral hand grasp, strong pedal push and pull. Strong pedal pulse. Pt has some weakness on right arm to lift it up. Problem: Cardiovascular  Goal: No DVT, peripheral vascular complications  Outcome: Ongoing  Pt without s/s of DVT. Pt able to leave SCD'S in place to help prevent development of DVT. Problem: GI  Goal: No bowel complications  Outcome: Ongoing  Pt with bowel sounds, passing flatus, and without nausea. Taking prescribed medications to assist with BM    Problem:   Goal: Adequate urinary output  Outcome: Ongoing  Pt voiding adequate amounts without difficulty per aguillon catheter. Will remove aguillon after therapy session. Problem: Nutrition  Goal: Optimal nutrition therapy  Outcome: Ongoing  Pt able to tolerate po fluids and food well. Problem: Skin Integrity/Risk  Goal: No skin breakdown during hospitalization  Outcome: Ongoing  Dressing is dry and intact and not due to be changed today. No other skin impairments noted. Pt understands the importance of frequent repositioning in order to prevent any skin breakdown. Problem: Musculor/Skeletal Functional Status  Goal: Absence of falls  Outcome: Ongoing  Pt using call light appropriately to call for assistance with ambulation to the bathroom and to chair. Pt is also compliant with use of non-skid slippers. Pt reports understanding of fall prevention when discussed. Problem: Falls - Risk of  Goal: Absence of falls  Outcome: Ongoing  Pt using call light appropriately to call for assistance with ambulation to the bathroom and to chair. Pt is also compliant with use of non-skid slippers.  Pt reports understanding of fall prevention when discussed. Comments: Care plan reviewed with patient. Patient verbalizes understanding of the plan of care and contribute to goal setting.

## 2018-03-04 NOTE — PROGRESS NOTES
Hospitalist Progress Note  [facility]       Patient: Opal Ratliffing  Unit/Bed: 7K-06/006-A  YOB: 1960  MRN: [id number]  Acct: [de-identified]   Admitting Diagnosis: Paraspinal abscess [facility]) [M46.20]  Admit Date:  3/1/2018  Hospital Day: 3    Subjective:    Patient has no new complaints today. Post op pain is controlled. Surgical cultures positive staphylococcus-coagulase positive. Patient Seen, Chart, Labs, Radiology studies, and Consults reviewed. Objective:   BP (!) 144/92   Pulse 78   Temp 98.2 °F (36.8 °C) (Oral)   Resp 18   Ht 6' 3\" (1.905 m)   Wt 202 lb 1.6 oz (91.7 kg)   SpO2 98%   BMI 25.26 kg/m²       Intake/Output Summary (Last 24 hours) at 03/04/18 1144  Last data filed at 03/04/18 0801   Gross per 24 hour   Intake          4071.65 ml   Output             5575 ml   Net         -1503.35 ml     Review of Systems   Constitutional: Negative for activity change, chills, fatigue and unexpected weight change. HENT: Negative for congestion, ear discharge, facial swelling, nosebleeds, sinus pain, sneezing, tinnitus and voice change. Eyes: Negative for redness, itching and visual disturbance. Respiratory: Negative for apnea, choking, shortness of breath, wheezing and stridor. Cardiovascular: Negative for chest pain, palpitations and leg swelling. Gastrointestinal: Negative for abdominal distention, anal bleeding, constipation, nausea, rectal pain and vomiting. Endocrine: Negative for heat intolerance, polyphagia and polyuria. Genitourinary: Negative for decreased urine volume, difficulty urinating, discharge, enuresis, frequency, genital sores, hematuria, penile swelling, scrotal swelling and testicular pain. Musculoskeletal: Positive for neck pain. Negative for arthralgias, gait problem, myalgias and neck stiffness. Skin: Negative for color change, rash and wound.    Neurological: Negative for dizziness, tremors, speech difficulty, weakness, numbness and headaches. Hematological: Negative for adenopathy. Does not bruise/bleed easily. Psychiatric/Behavioral: Negative for agitation, confusion, hallucinations, self-injury, sleep disturbance and suicidal ideas. Physical Exam   Constitutional: He is oriented to person, place, and time. He appears well-developed and well-nourished. No distress. HENT:   Head: Normocephalic and atraumatic. Right Ear: External ear normal.   Left Ear: External ear normal.   Nose: Nose normal.   Mouth/Throat: Oropharynx is clear and moist. No oropharyngeal exudate. Neck surgical drain. Eyes: Conjunctivae and EOM are normal. Pupils are equal, round, and reactive to light. Right eye exhibits no discharge. Left eye exhibits no discharge. No scleral icterus. Neck: Normal range of motion. Neck supple. No JVD present. No tracheal deviation present. No thyromegaly present. Cardiovascular: Normal rate, regular rhythm, normal heart sounds and intact distal pulses. Exam reveals no gallop and no friction rub. No murmur heard. Pulmonary/Chest: Effort normal and breath sounds normal. No stridor. No respiratory distress. He has no wheezes. He has no rales. He exhibits no tenderness. Abdominal: Soft. Bowel sounds are normal. He exhibits no distension and no mass. There is no tenderness. There is no rebound and no guarding. Musculoskeletal: Normal range of motion. He exhibits tenderness. He exhibits no edema or deformity. Right neck tenderness. Lymphadenopathy:     He has no cervical adenopathy. Neurological: He is alert and oriented to person, place, and time. He displays normal reflexes. No cranial nerve deficit. He exhibits normal muscle tone. Coordination normal.   Skin: Skin is warm and dry. No rash noted. He is not diaphoretic. No erythema. No pallor. Psychiatric: He has a normal mood and affect. His behavior is normal. Judgment and thought content normal.   Vitals reviewed.     Medications:    ceFAZolin  2 g

## 2018-03-04 NOTE — PROGRESS NOTES
[facility]  INPATIENT PHYSICAL THERAPY  EVALUATION  [facility] ORTHOPEDICS 7K - 7K-06/006-A    Time In: 9304  Time Out: 1028  Timed Code Treatment Minutes: 24 Minutes  Minutes: 38          Date: 3/4/2018  Patient Name: James Moctezuma,  Gender:  male        MRN: [id number]  : 1960  (59 y.o.)      Referring Practitioner: Solitario Santiago MD  Diagnosis: Paraspinal abscess  Additional Pertinent Hx: Per ED note:  James Moctezuma is a 59 y.o. male who presents to the Emergency Department for the evaluation of right neck and shoulder pain. The patient states that he was seen in the ED on 18 for the same issue and was diagnosed with torticollis. He was prescribed Ibuprofen, Vallum and Norco which he reports has given him no relief. He denies having a recent neck injury or numbness or tingling. The patient says that since this pain he usually has a headache but has not had one the past couple of days. He reports that he has lost 20 pounds in the last week due to not moving around and not eating secondary to pain. He reports a history of an open heart surgery and family cardiac history. Pt with cervical paraspinal abscess, underwent bilateral posterior cervical 3-7 laminectomies and drainage of epeidural and paraspinal abscesses on 3/3. Past Medical History:   Diagnosis Date    CAD (coronary artery disease)     [facility]est[facility]     Diabetes mellitus ([facility] [address]      Past Surgical History:   Procedure Laterality Date    CARDIAC SURGERY         Restrictions/Precautions:  Restrictions/Precautions: General Precautions, Fall Risk    Position Activity Restriction  Spinal Precautions: No Bending, No Lifting, No Twisting (Cervical precautions)       Subjective:  Chart Reviewed: Yes  Patient assessed for rehabilitation services?: Yes  Family / Caregiver Present: No  Subjective: RN approved session, states pt has been tearful due to significant R UE weakness.   Pt is supine in bed and agreeable

## 2018-03-04 NOTE — PROGRESS NOTES
[address]  INPATIENT OCCUPATIONAL THERAPY  [facility] ORTHOPEDICS 7K  EVALUATION    Time:  Time In: 8494  Time Out: 8298  Timed Code Treatment Minutes: 0 Minutes  Minutes: 10          Date: 3/4/2018  Patient Name: Mik Everett,   Gender: male      MRN: [id number]  : 1960  (59 y.o.)  Referring Practitioner: Deep Nunez MD  Diagnosis: paraspinal abcess  Additional Pertinent Hx: Per ED note:  Mik Everett is a 59 y.o. male who presents to the Emergency Department for the evaluation of right neck and shoulder pain. The patient states that he was seen in the ED on 18 for the same issue and was diagnosed with torticollis. He was prescribed Ibuprofen, Vallum and Norco which he reports has given him no relief. He denies having a recent neck injury or numbness or tingling. The patient says that since this pain he usually has a headache but has not had one the past couple of days. He reports that he has lost 20 pounds in the last week due to not moving around and not eating secondary to pain. He reports a history of an open heart surgery and family cardiac history. Pt with cervical paraspinal abscess, underwent bilateral posterior cervical 3-7 laminectomies and drainage of epeidural and paraspinal abscesses on 3/3.      Restrictions/Precautions:  Restrictions/Precautions: General Precautions, Fall Risk            Position Activity Restriction  Spinal Precautions: No Bending, No Lifting, No Twisting (cervical precautions)         Past Medical History:   Diagnosis Date    CAD (coronary artery disease)     [facility]congest[facility] failure) ([facility])     Diabetes mellitus ([facility] [address]      Past Surgical History:   Procedure Laterality Date    CARDIAC SURGERY             Subjective  Chart Reviewed: Yes (H&P, orders, progress notes)  Patient assessed for rehabilitation services?: Yes  Family / Caregiver Present: Yes (son)    Subjective: Pt supine in bed upon arrival with RN present in room initially to give pain meds. Pt requesting to use bathroom stating \"if no one was in here soon I was going to go by myself. \"   Comments: RN ok'd session this date. General:  Overall Orientation Status: Within Functional Limits    Vision: Within Functional Limits    Hearing: Within functional limits         Pain:  Pain Assessment  Patient Currently in Pain: Yes  Pain Assessment: 0-10 (did not quantify)  Pain Type: Surgical pain  Pain Location: Neck  Pain Orientation: Posterior  Pain Descriptors: Aching  Pain Frequency: Continuous  Clinical Progression: Not changed  Pain Intervention(s): Repositioned; Ambulation/Increased activity; Rest (RN initially to give oral pain meds, although pt stated \"I'd prefer to have the dilaudid since it works quicker then I can take the other one later. \" Rn informed had ~30 minutes before able to give dilaudid and pt requesting to wait until then for pain m)       Social/Functional:  Lives With: Family (Sister)  Type of Home: House  Home Layout: One level  Home Access: Stairs to enter with rails  Entrance Stairs - Number of Steps: 2  Entrance Stairs - Rails: Left  Home Equipment: Rolling walker, Cane     ADL Assistance: Independent          Ambulation Assistance: Independent  Transfer Assistance: Independent    Active : Yes  Occupation: Full time employment  Type of occupation: [address] [employer]  Additional Comments: Pt states he amb without AD prior to admission. No further information obtained re: bathroom setup/ADL/IADL prior level of function due to family present to visit and pt declining to further answer questions at this time.      Objective        Overall Cognitive Status: Exceptions  Cognition Comment: slightly impulsive, decreased safety awareness/need for assistance with mobility    Perception  Overall Perceptual Status: WFL         Observation: hemovac, IV    Observation/Palpation  Observation: hemovac, IV    Hand Dominance: Right            LUE AROM (degrees)  LUE AROM : WFL          RUE AROM (degrees)  RUE AROM : Exceptions  RUE General AROM: limited active movement at shoulder, through observation, as pt refused formal assessment; elbow, wrist, hand appear WFL       LUE Strength  Gross LUE Strength: WFL    RUE Strength  Gross RUE Strength: Exceptions to [facility]  RUE Strength Comment: shoulder 2-/5 through observation (pt refused formal assessment); elbow, wrist, hand WFL    RUE Tone: Normotonic  LUE Tone: Normotonic    Movements Are Fluid And Coordinated: Yes    ADL  Feeding: Setup (required RN to tip pill cup into mouth as pt reported could not complete on own, although able to take drink of water from mug without assistance)  Grooming: Stand by assistance (washing/drying hands while standing at sink)  Toileting: Stand by assistance (urinating while standing at toilet)     Bed mobility  Supine to Sit: Minimal assistance (for trunk elevation; pt requesting to pull with RUE to elevate trunk, despite cues/education re: cervical precautions)  Sit to Supine: Stand by assistance  Scooting: Stand by assistance (advancing hips to EOB)    Transfers  Sit to stand: Contact guard assistance (from EOB)  Stand to sit: Contact guard assistance (to EOB)    Balance  Sitting Balance: Supervision (at EOB)  Standing Balance: Contact guard assistance     Time: X1 minute  Activity: toileting/grooming tasks  Comment: no UE support required     Functional Mobility  Functional - Mobility Device: Other (IV pole)  Activity: To/from bathroom  Assist Level: Contact guard assistance  Functional Mobility Comments: Pt initially utilized IV pole for support to bathroom, although did not use for support while in bathroom or demoing functional mobility from bathroom. Pt refused further mobility after use of bathroom as family present to visit.       Activity Tolerance:  Activity Tolerance: Patient Tolerated treatment well  Activity Tolerance: Pt refused further assessment of R shoulder ROM/limitations, ADLs, or functional mobility due to family present to visit. Education provided re: role of OT, importance of further participation, and needing staff assistance for all mobility at this time. Pt verbalized understanding although question compliance. Assessment:  Assessment: Pt presents with increased assistance required for ADL, functional mobility, and transfers along with decreased R shoulder function s/p cervical laminectomies. Pt will continue to benefit from OT services to increase independence with these tasks to facilitate return to PLOF. Performance deficits / Impairments: Decreased functional mobility , Decreased ADL status, Decreased ROM, Decreased strength, Decreased safe awareness, Decreased endurance, Decreased balance, Decreased high-level IADLs  Prognosis: Fair  Discharge Recommendations: Patient would benefit from continued therapy after discharge, Continue to assess pending progress    Clinical Decision Making: Clinical Decision making was of Moderate Complexity as the result of analysis of data from a detailed assessment, a consideration of several treatment options, the presence of comorbidities affecting the plan of care and the need for minimal to moderate modifications or assistance required to complete the evaluation. Patient Education:  Patient Education: OT role, POC, safety with transfers/mobility    Equipment Recommendations:   Other: TBD pending progress; will continue to assess     Safety:  Safety Devices in place: Yes  Type of devices: Bed alarm in place, Call light within reach, Gait belt, Left in bed, Nurse notified    Plan:  Times per week: 6x  Current Treatment Recommendations: Strengthening, ROM, Balance Training, Functional Mobility Training, Endurance Training, Patient/Caregiver Education & Training, Equipment Evaluation, Education, & procurement, Safety Education & Training, Self-Care / ADL    Goals:  Patient goals : \"go home\"    Short term goals  Time Frame for Short term goals: 2 weeks  Short term goal 1: Pt will demo functional mobility to/from bathroom and household distances with supervision to increase activity tolerance needed for ADL/IADL tasks. Short term goal 2: Pt will complete RUE shoulder AAROM/AROM exercises X10 reps to tolerance to increase overall ease with UB ADL tasks. Short term goal 3: Pt will complete BADL tasks with supervision, using modifications prn and no cues for cervical precautions   Short term goal 4: Pt will complete all functional transfers with supervision and no cues for safety/precautions to increase ease with ADL completion  Long term goals  Time Frame for Long term goals : not set due to ELOS    Evaluation Complexity: Based on the findings of patient history, examination, clinical presentation, and decision making during this evaluation, this patient is of medium complexity.     AM-PAC Inpatient Daily Activity Raw Score: 19  AM-PAC Inpatient ADL T-Scale Score : 40.22  ADL Inpatient CMS 0-100% Score: 42.8  ADL Inpatient CMS G-Code Modifier : CK

## 2018-03-04 NOTE — ANESTHESIA POSTPROCEDURE EVALUATION
Department of Anesthesiology  Postprocedure Note    Patient: Shirley Cobos  MRN: [id number]  YOB: 1960  Date of evaluation: 3/3/2018  Time:  9:56 PM     Procedure Summary     Date:  03/03/18 Room / Location:  [address] [place]    Anesthesia Start:  2501 Anesthesia Stop:  0700    Procedure:  POSTERIOR BILATERAL C3-6 LAMINECTOMY FOR DRAINAGE OF ABSCESS (N/A Spine Cervical) Diagnosis:  (BACK ABSCESS)    Surgeon:  Andressa Hernandez MD Responsible Provider:  Miladis Geronimo MD    Anesthesia Type:  general ASA Status:  3          Anesthesia Type: general    Robert Phase I: Robert Score: 9    Robert Phase II:      Last vitals: Reviewed and per EMR flowsheets. Anesthesia Post Evaluation    Patient location during evaluation: PACU  Patient participation: complete - patient participated  Level of consciousness: awake and alert  Airway patency: patent  Nausea & Vomiting: no nausea and no vomiting  Complications: no  Cardiovascular status: hemodynamically stable  Respiratory status: acceptable  Hydration status: euvolemic      [facility]  POST-ANESTHESIA NOTE       Name:  Shirley Cobos                                         Age:  59 y.o.   MRN:  [id number]      Last Vitals:  BP (!) 174/85   Pulse 84   Temp 98.7 °F (37.1 °C) (Oral)   Resp 20   Ht 6' 3\" (1.905 m)   Wt 202 lb 2 oz (91.7 kg)   SpO2 94%   BMI 25.26 kg/m²   Patient Vitals for the past 4 hrs:   BP Temp Temp src Pulse Resp SpO2   03/03/18 2025 (!) 174/85 98.7 °F (37.1 °C) Oral 84 20 94 %       Level of Consciousness:  Awake    Respiratory:  Stable    Oxygen Saturation:  Stable    Cardiovascular:  Stable    Hydration:  Adequate    PONV:  Stable    Post-op Pain:  Adequate analgesia    Post-op Assessment:  No apparent anesthetic complications    Additional Follow-Up / Treatment / Comment:  None    Tri Hancock MD  March 3, 2018   9:57 PM

## 2018-03-05 LAB
ANION GAP SERPL CALCULATED.3IONS-SCNC: 10 MEQ/L (ref 8–16)
BUN BLDV-MCNC: 16 MG/DL (ref 7–22)
CALCIUM SERPL-MCNC: 9.6 MG/DL (ref 8.5–10.5)
CHLORIDE BLD-SCNC: 94 MEQ/L (ref 98–111)
CO2: 30 MEQ/L (ref 23–33)
CREAT SERPL-MCNC: 0.9 MG/DL (ref 0.4–1.2)
GFR SERPL CREATININE-BSD FRML MDRD: 86 ML/MIN/1.73M2
GLUCOSE BLD-MCNC: 164 MG/DL (ref 70–108)
POTASSIUM SERPL-SCNC: 4.4 MEQ/L (ref 3.5–5.2)
SODIUM BLD-SCNC: 134 MEQ/L (ref 135–145)

## 2018-03-05 PROCEDURE — 80048 BASIC METABOLIC PNL TOTAL CA: CPT

## 2018-03-05 PROCEDURE — 36415 COLL VENOUS BLD VENIPUNCTURE: CPT

## 2018-03-05 PROCEDURE — 6360000002 HC RX W HCPCS: Performed by: INTERNAL MEDICINE

## 2018-03-05 PROCEDURE — 99232 SBSQ HOSP IP/OBS MODERATE 35: CPT | Performed by: INTERNAL MEDICINE

## 2018-03-05 PROCEDURE — 2580000003 HC RX 258: Performed by: INTERNAL MEDICINE

## 2018-03-05 PROCEDURE — 97530 THERAPEUTIC ACTIVITIES: CPT

## 2018-03-05 PROCEDURE — 6370000000 HC RX 637 (ALT 250 FOR IP): Performed by: NEUROLOGICAL SURGERY

## 2018-03-05 PROCEDURE — 97110 THERAPEUTIC EXERCISES: CPT

## 2018-03-05 PROCEDURE — 1200000000 HC SEMI PRIVATE

## 2018-03-05 PROCEDURE — 97535 SELF CARE MNGMENT TRAINING: CPT

## 2018-03-05 PROCEDURE — 6360000002 HC RX W HCPCS: Performed by: NEUROLOGICAL SURGERY

## 2018-03-05 RX ORDER — OXYCODONE HCL 10 MG/1
10 TABLET, FILM COATED, EXTENDED RELEASE ORAL EVERY 12 HOURS SCHEDULED
Status: COMPLETED | OUTPATIENT
Start: 2018-03-05 | End: 2018-03-06

## 2018-03-05 RX ADMIN — OXYCODONE HYDROCHLORIDE 10 MG: 5 TABLET ORAL at 21:48

## 2018-03-05 RX ADMIN — CEFAZOLIN SODIUM 2 G: 2 SOLUTION INTRAVENOUS at 02:31

## 2018-03-05 RX ADMIN — VANCOMYCIN HYDROCHLORIDE 1500 MG: 10 INJECTION, POWDER, LYOPHILIZED, FOR SOLUTION INTRAVENOUS at 04:40

## 2018-03-05 RX ADMIN — OXYCODONE HYDROCHLORIDE 10 MG: 5 TABLET ORAL at 13:01

## 2018-03-05 RX ADMIN — Medication 10 ML: at 11:12

## 2018-03-05 RX ADMIN — HYDROMORPHONE HYDROCHLORIDE 1 MG: 1 INJECTION, SOLUTION INTRAMUSCULAR; INTRAVENOUS; SUBCUTANEOUS at 05:45

## 2018-03-05 RX ADMIN — Medication 10 ML: at 20:30

## 2018-03-05 RX ADMIN — METHOCARBAMOL 750 MG: 500 TABLET ORAL at 16:10

## 2018-03-05 RX ADMIN — ENOXAPARIN SODIUM 40 MG: 40 INJECTION SUBCUTANEOUS at 08:53

## 2018-03-05 RX ADMIN — VANCOMYCIN HYDROCHLORIDE 1500 MG: 10 INJECTION, POWDER, LYOPHILIZED, FOR SOLUTION INTRAVENOUS at 16:10

## 2018-03-05 RX ADMIN — OXYCODONE HYDROCHLORIDE 10 MG: 5 TABLET ORAL at 00:34

## 2018-03-05 RX ADMIN — HYDROMORPHONE HYDROCHLORIDE 1 MG: 1 INJECTION, SOLUTION INTRAMUSCULAR; INTRAVENOUS; SUBCUTANEOUS at 02:31

## 2018-03-05 RX ADMIN — DOCUSATE SODIUM 100 MG: 100 CAPSULE ORAL at 20:30

## 2018-03-05 RX ADMIN — FAMOTIDINE 20 MG: 20 TABLET, FILM COATED ORAL at 08:53

## 2018-03-05 RX ADMIN — OXYCODONE HYDROCHLORIDE 10 MG: 5 TABLET ORAL at 17:16

## 2018-03-05 RX ADMIN — HYDROMORPHONE HYDROCHLORIDE 1 MG: 1 INJECTION, SOLUTION INTRAMUSCULAR; INTRAVENOUS; SUBCUTANEOUS at 14:05

## 2018-03-05 RX ADMIN — FAMOTIDINE 20 MG: 20 TABLET, FILM COATED ORAL at 20:30

## 2018-03-05 RX ADMIN — OXYCODONE HYDROCHLORIDE 10 MG: 5 TABLET ORAL at 08:53

## 2018-03-05 RX ADMIN — METHOCARBAMOL 750 MG: 500 TABLET ORAL at 09:57

## 2018-03-05 RX ADMIN — HYDROMORPHONE HYDROCHLORIDE 1 MG: 1 INJECTION, SOLUTION INTRAMUSCULAR; INTRAVENOUS; SUBCUTANEOUS at 18:24

## 2018-03-05 RX ADMIN — OXYCODONE HYDROCHLORIDE 10 MG: 5 TABLET ORAL at 04:41

## 2018-03-05 RX ADMIN — DOCUSATE SODIUM 100 MG: 100 CAPSULE ORAL at 08:53

## 2018-03-05 RX ADMIN — HYDROMORPHONE HYDROCHLORIDE 1 MG: 1 INJECTION, SOLUTION INTRAMUSCULAR; INTRAVENOUS; SUBCUTANEOUS at 09:57

## 2018-03-05 RX ADMIN — OXYCODONE HYDROCHLORIDE 10 MG: 10 TABLET, FILM COATED, EXTENDED RELEASE ORAL at 12:23

## 2018-03-05 RX ADMIN — CEFAZOLIN SODIUM 2 G: 2 SOLUTION INTRAVENOUS at 20:30

## 2018-03-05 RX ADMIN — CEFAZOLIN SODIUM 2 G: 2 SOLUTION INTRAVENOUS at 11:12

## 2018-03-05 ASSESSMENT — PAIN SCALES - GENERAL
PAINLEVEL_OUTOF10: 9
PAINLEVEL_OUTOF10: 10
PAINLEVEL_OUTOF10: 7
PAINLEVEL_OUTOF10: 10
PAINLEVEL_OUTOF10: 10
PAINLEVEL_OUTOF10: 8
PAINLEVEL_OUTOF10: 10
PAINLEVEL_OUTOF10: 8
PAINLEVEL_OUTOF10: 7
PAINLEVEL_OUTOF10: 0
PAINLEVEL_OUTOF10: 10
PAINLEVEL_OUTOF10: 7
PAINLEVEL_OUTOF10: 9
PAINLEVEL_OUTOF10: 8
PAINLEVEL_OUTOF10: 10
PAINLEVEL_OUTOF10: 10
PAINLEVEL_OUTOF10: 8
PAINLEVEL_OUTOF10: 10
PAINLEVEL_OUTOF10: 9

## 2018-03-05 ASSESSMENT — PAIN DESCRIPTION - PAIN TYPE
TYPE: SURGICAL PAIN

## 2018-03-05 ASSESSMENT — PAIN DESCRIPTION - ONSET
ONSET: ON-GOING
ONSET: ON-GOING

## 2018-03-05 ASSESSMENT — PAIN DESCRIPTION - ORIENTATION
ORIENTATION: POSTERIOR

## 2018-03-05 ASSESSMENT — PAIN DESCRIPTION - DESCRIPTORS
DESCRIPTORS: ACHING

## 2018-03-05 ASSESSMENT — PAIN DESCRIPTION - LOCATION
LOCATION: BACK;NECK
LOCATION: NECK
LOCATION: BACK;NECK
LOCATION: NECK
LOCATION: BACK;NECK

## 2018-03-05 ASSESSMENT — PAIN DESCRIPTION - PROGRESSION: CLINICAL_PROGRESSION: GRADUALLY WORSENING

## 2018-03-05 NOTE — PROGRESS NOTES
[address]  INPATIENT OCCUPATIONAL THERAPY  [facility] ORTHOPEDICS 7K  DAILY NOTE    Time:  Time In: 1430  Time Out: 1455  Timed Code Treatment Minutes: 25 Minutes  Minutes: 25    Date: 3/5/2018  Patient Name: Corina Trotter,   Gender: male      Room: [facility]06/006-A  MRN: [id number]  : 1960  (59 y.o.)  Referring Practitioner: Esvin Luu MD  Diagnosis: paraspinal abcess  Additional Pertinent Hx: Per ED note:  Corina Trotter is a 59 y.o. male who presents to the Emergency Department for the evaluation of right neck and shoulder pain. The patient states that he was seen in the ED on 18 for the same issue and was diagnosed with torticollis. He was prescribed Ibuprofen, Vallum and Norco which he reports has given him no relief. He denies having a recent neck injury or numbness or tingling. The patient says that since this pain he usually has a headache but has not had one the past couple of days. He reports that he has lost 20 pounds in the last week due to not moving around and not eating secondary to pain. He reports a history of an open heart surgery and family cardiac history. Pt with cervical paraspinal abscess, underwent bilateral posterior cervical 3-7 laminectomies and drainage of epeidural and paraspinal abscesses on 3/3. Restrictions/Precautions:  Restrictions/Precautions: General Precautions, Fall Risk    Position Activity Restriction  Spinal Precautions: No Bending, No Lifting, No Twisting (cervical precautions)      Past Medical History:   Diagnosis Date    CAD (coronary artery disease)     [facility]) [facility] [address]     Diabetes mellitus ([facility] [address]      Past Surgical History:   Procedure Laterality Date    CARDIAC SURGERY             Subjective  Subjective: RN okayed OT session. Upon arrival patient was lying in bed. Pt was agreeable to OT session.      Overall Orientation Status: Within Functional Limits    Pain:  Pain Assessment  Patient Currently in Pain: Yes  Pain Assessment: 0-10  Pain Level: 10  Pain Type: Surgical pain  Pain Location: Back;Neck  Pain Orientation: Posterior       Objective  Overall Cognitive Status: Exceptions  Cognition Comment: slightly impulsive, decreased safety awareness/need for assistance with mobility    ADL  Grooming: Minimal assistance (To turn on water to wash hands d/t pain and decreased R shoulder ROM. )  Toileting: Stand by assistance (Urinating while standing by toilet. )     Bed mobility  Supine to Sit: Minimal assistance (With HOB elevated and bedrail. )  Sit to Supine: Minimal assistance (HOB elevated. )  Scooting: Stand by assistance (to scoot hips to EOB. )    Transfers  Sit to stand: Contact guard assistance (From EOB with min vc for hand placement. )  Stand to sit: Contact guard assistance (Onto EOB. )     Balance  Sitting Balance: Supervision  Standing Balance: Contact guard assistance     Time: X1 minute  Activity: toileting/grooming tasks     Functional Mobility  Functional - Mobility Device: Rolling Walker  Activity: To/from bathroom  Assist Level: Contact guard assistance  Functional Mobility Comments: Pt ambulated to/from BR, slow pace, No LOB, Pt refused further mobility in [facility] d/t pain. Type of ROM/Therapeutic Exercise: AROM  Comment: Pt tolerated R shoulder flexion AAROM to 80 degrees x 4 reps, pt refused further exercises or assessment d/t pain. Pt educated on importance of completing AAROM exercises. Activity Tolerance:  Activity Tolerance: Patient limited by pain; Patient limited by fatigue    Assessment:   Performance deficits / Impairments: Decreased functional mobility , Decreased ADL status, Decreased ROM, Decreased strength, Decreased safe awareness, Decreased endurance, Decreased balance, Decreased high-level IADLs  Prognosis: Fair  Discharge Recommendations: Patient would benefit from continued therapy after discharge, Continue to assess pending progress    Patient Education:  Patient Education: OT role, POC, safety with transfers/mobility, self cares, HEP    Equipment Recommendations: Other: TBD pending progress; will continue to assess     Safety:  Safety Devices in place: Yes  Type of devices: Bed alarm in place, Call light within reach, Left in bed, Nurse notified (Pt refused to wear gait belt. )    Plan:  Times per week: 6x  Current Treatment Recommendations: Strengthening, ROM, Balance Training, Functional Mobility Training, Endurance Training, Patient/Caregiver Education & Training, Equipment Evaluation, Education, & procurement, Safety Education & Training, Self-Care / ADL    Goals:  Patient goals : \"go home\"    Short term goals  Time Frame for Short term goals: 2 weeks  Short term goal 1: Pt will demo functional mobility to/from bathroom and household distances with supervision to increase activity tolerance needed for ADL/IADL tasks. Short term goal 2: Pt will complete RUE shoulder AAROM/AROM exercises X10 reps to tolerance to increase overall ease with UB ADL tasks.    Short term goal 3: Pt will complete BADL tasks with supervision, using modifications prn and no cues for cervical precautions   Short term goal 4: Pt will complete all functional transfers with supervision and no cues for safety/precautions to increase ease with ADL completion  Long term goals  Time Frame for Long term goals : not set due to ELOS

## 2018-03-05 NOTE — OP NOTE
[address] [place] [address]                                 OPERATIVE REPORT    PATIENT NAME: Elby Dakin                      :        1960  MED REC NO:   [id number]                           ROOM:       0006  ACCOUNT NO:   [de-identified]                           ADMIT DATE: 2018  PROVIDER:     Haja Jones. Devan Hickman M.D. Patient: Cayetano Chilel  YOB: 1960  MRN: [id number]  Date of Procedure: 3/3/2018     Pre-Op Diagnosis: Cervical epidural and paraspinal abscess; cervical stenosis; cervical ddd     Post-Op Diagnosis: Cervical epidural and paraspinal abscess; cervical stenosis; cervical ddd       Procedure(s):  POSTERIOR BILATERAL C3-7 LAMINECTOMIES FOR DRAINAGE OF ABSCESS     Anesthesia: General     Surgeon(s):  Nelson Patino MD     Staff:  Scrub Person First: Yao Ojeda  Scrub Person Second: Preston Fraga      Estimated Blood Loss: 50  mL     Complications: None     Specimens:   ID Type Source Tests Collected by Time Destination   1 : CERVICAL EPIDURAL ABSCESS Tissue Spine FUNGUS CULTURE, AFB STAIN, ANAEROBIC AND AEROBIC CULTURE Nelson Patino MD 3/3/2018 7410     A : Laminectomy tissue C3-C7 Tissue Spine SURGICAL PATHOLOGY Nelson Patino MD 3/3/2018 4814           Implants:  * No implants in log *      Drains:   MEDIUM HEMOVAC     Findings: Post-op Diagnosis is confirmed. SURGEON:  Haja Jones. Devan Hickman M.D. PROCEDURE IN DETAIL:  The patient was intubated by anesthesia staff on a  supine position. Hernández catheter was placed. He was then log rolled into a  prone position. Chest and abdomen were supported on gel chest rolls. His  face was supported on an anesthesia foam pillow with his neck in a flex  position. The hair on the nape of his neck was clipped and the posterior  neck region was prepped and draped in usual neurosurgical fashion.   Midline  cervical laminectomy incision was made, carried down to the spinous  processes of the cervical 2 to cervical 7 levels and then a subperiosteal  dissection was performed to expose the spinous processes at cervical 3  through upper cervical 7 levels and the tip of the cervical 2 spinous  process. This dissection was carried down to expose lamina at these levels  as well to the facets. There appeared to be a paraspinal abscess in the  region of the right L3-4 facet and this was removed. There was more  granulation tissue removed with rongeur and then I performed decompressive  laminectomy cervical 3 through upper cervical 7 levels by removing the  spinous processes at cervical 3, 4, 5 and 6 levels, thinning the lamina  with high-speed air drill and then using Leksell rongeurs to decompress the  lamina from cervical 3 through cervical 6 levels, which were complete  laminectomies and at the upper cervical 7 levels. The laminectomies were  widened with Kerrison rongeurs as well out to the facets. The epidural  abscess appeared to be granulation tissue but not al purulence and this  was right and right lateral at cervical 3 through cervical 6 levels. The  laminectomies were carried down as far as the upper cervical 7 levels  because of underlying degenerative disc disease and stenosis, that was  contributing to the significant stenosis at these levels as well. The  wound was irrigated with Bacitracin solution, 1 gm of vancomycin powder was  then instilled in the wound before closure. There was no signs of CSF  leak, no signs of dural tear, and then a medium Hemovac drain brought out  through separate stab incisions secured to the skin with 2-0 Vicryl  sutures. The deep fascial layer was closed with 0 interrupted Vicryl  sutures, superficial fascial layer with 2-0 interrupted Vicryl sutures,  subcuticular layer with 3-0 continuous subcuticular Stratafix, which is 2-0  size, but 3-0 tensile strength size.   Skin clips were used for the skin and  then a sterile

## 2018-03-05 NOTE — PROGRESS NOTES
Physical Therapy   [facility]  INPATIENT PHYSICAL THERAPY  DAILYNOTE  [facility] - 7K-06/006-A    Time In: 4525  Time Out: 0935  Timed Code Treatment Minutes: 23 Minutes  Minutes: 23          Date: 3/5/2018  Patient Name: Tri Hussein,  Gender:  male        MRN: [id number]  : 1960  (59 y.o.)     Referring Practitioner: Robyn Maldonado MD  Diagnosis: Paraspinal abscess  Additional Pertinent Hx: Per ED note:  Tri Hussein is a 59 y.o. male who presents to the Emergency Department for the evaluation of right neck and shoulder pain. The patient states that he was seen in the ED on 18 for the same issue and was diagnosed with torticollis. He was prescribed Ibuprofen, Vallum and Norco which he reports has given him no relief. He denies having a recent neck injury or numbness or tingling. The patient says that since this pain he usually has a headache but has not had one the past couple of days. He reports that he has lost 20 pounds in the last week due to not moving around and not eating secondary to pain. He reports a history of an open heart surgery and family cardiac history. Pt with cervical paraspinal abscess, underwent bilateral posterior cervical 3-7 laminectomies and drainage of epeidural and paraspinal abscesses on 3/3. Past Medical History:   Diagnosis Date    CAD (coronary artery disease)     [facility]estive [facility]     Diabetes mellitus ([address]      Past Surgical History:   Procedure Laterality Date    CARDIAC SURGERY         Restrictions/Precautions:  Restrictions/Precautions: General Precautions, Fall Risk            Position Activity Restriction  Spinal Precautions: No Bending, No Lifting, No Twisting (cervical precautions)         Subjective:     Subjective: RN approved session. Attempted pt mult times this am before pt agreeable to session.  Pt states his \"legs are just fine and strong\" Pt however agreeable to amb and perform therex with mod

## 2018-03-05 NOTE — PROGRESS NOTES
[facility]  OCCUPATIONAL THERAPY MISSED TREATMENT NOTE  [facility] ORTHOPEDICS 7K  7K-06/006-A      Date: 3/5/2018  Patient Name: Dougie Shaver        CSN: [id number]   : 1960  (59 y.o.)  Gender: male   Referring Practitioner: Darlin Osman MD  Diagnosis: paraspinal abcess         REASON FOR MISSED TREATMENT:  Missed Treat. RN okayed OT session. Upon arrival patient was lying in bed. Pt requested to wait until pain meds go in effect. OT will see as time allows.

## 2018-03-05 NOTE — PLAN OF CARE
Problem: Pain:  Goal: Pain level will decrease  Pain level will decrease    Outcome: Ongoing  Patient pain 8-10/10. Patient using prn pain medication when available. Received order from Dr Mercedes Anderson for oxycodone ER. Patient updated,    Problem: Neurological  Goal: Maximum potential motor/sensory/cognitive function  Outcome: Ongoing  Patient still experiencing limited movement with right arm. Patient alert and oriented. Patient working with PT/OT. Problem: Cardiovascular  Goal: No DVT, peripheral vascular complications  Outcome: Ongoing  No s/s of DVT noted this shift. Patient on lovenox for anticoagulant. Problem: GI  Goal: No bowel complications  Outcome: Ongoing  No BM yet this shift. Bowel sounds active and patient states he is passing gas. Problem: Nutrition  Goal: Optimal nutrition therapy  Outcome: Ongoing  Patient tolerating a carb control diet. Problem: Skin Integrity/Risk  Goal: No skin breakdown during hospitalization  Outcome: Ongoing  Surgical dressing to posterior neck clean dry and intact. No new skin breakdown noted this shift. Problem: Musculor/Skeletal Functional Status  Goal: Absence of falls  Outcome: Ongoing  Patient remains free from falls this shift. Call light within reach and hourly rounding performed. Problem: Falls - Risk of  Goal: Absence of falls  Outcome: Ongoing  Patient remains free from falls this shift. Call light within reach and hourly rounding performed. Problem: Discharge Planning:  Goal: Discharged to appropriate level of care  Discharged to appropriate level of care   Outcome: Ongoing  Discharge planning ongoing at this time. Comments: Care plan reviewed with patient. Patient verbalizes an understanding of plan of care and contributes to goal setting.

## 2018-03-05 NOTE — PROGRESS NOTES
non-tender, non-distended with normal bowel sounds. Musculoskeletal: tenderness over right sided of posterior neck   Skin: Skin color, texture, turgor normal.  No rashes or lesions. Neurologic:  Neurovascularly intact without any focal sensory/motor deficits. Cranial nerves: II-XII intact, grossly non-focal.  Psychiatric: Alert and oriented        Labs:   Recent Labs      03/04/18   0410   WBC  17.3*   HGB  13.3*   HCT  39.6*   PLT  326     Recent Labs      03/04/18   0410  03/05/18   0611   NA  135  134*   K  4.7  4.4   CL  99  94*   CO2  26  30   BUN  18  16   CREATININE  0.8  0.9   CALCIUM  9.1  9.6     No results for input(s): AST, ALT, BILIDIR, BILITOT, ALKPHOS in the last 72 hours. No results for input(s): INR in the last 72 hours. No results for input(s): Sameul Malathi in the last 72 hours. Urinalysis:    No results found for: Fredick Slates, BACTERIA, RBCUA, BLOODU, SPECGRAV, [address]    Radiology:  XR CERVICAL SPINE (2-3 VIEWS)   Final Result    Intraoperative localizing images. **This report has been created using voice recognition software. It may contain minor errors which are inherent in voice recognition technology. **      Final report electronically signed by Dr. John Vee on 3/3/2018 10:24 AM      FLUORO FOR SURGICAL PROCEDURES   Final Result      MRI CERVICAL SPINE W WO CONTRAST   Final Result         1. Septic arthritis of the right facet joint of the C4-5 level with an associated 4.7 cm in length epidural abscess in the dorsal and right lateral aspects of the cervical spinal canal causing severe spinal canal at the C4-5 level. There is also a large    abscess in the posterior paraspinal musculature on the right. There is severe right foraminal stenosis at the C4-5 level. 2. Moderate-severe spinal canal stenosis at the C3-4 level due to a combination of the epidural abscess and degenerative changes.       3. Significant spinal canal stenosis at the C5-6 and C6-7

## 2018-03-06 LAB
ANION GAP SERPL CALCULATED.3IONS-SCNC: 13 MEQ/L (ref 8–16)
BUN BLDV-MCNC: 17 MG/DL (ref 7–22)
CALCIUM SERPL-MCNC: 9.5 MG/DL (ref 8.5–10.5)
CHLORIDE BLD-SCNC: 91 MEQ/L (ref 98–111)
CO2: 26 MEQ/L (ref 23–33)
CREAT SERPL-MCNC: 0.9 MG/DL (ref 0.4–1.2)
GFR SERPL CREATININE-BSD FRML MDRD: 86 ML/MIN/1.73M2
GLUCOSE BLD-MCNC: 208 MG/DL (ref 70–108)
HCT VFR BLD CALC: 45.1 % (ref 42–52)
HEMOGLOBIN: 15.4 GM/DL (ref 14–18)
MCH RBC QN AUTO: 31.7 PG (ref 27–31)
MCHC RBC AUTO-ENTMCNC: 34.1 GM/DL (ref 33–37)
MCV RBC AUTO: 93.1 FL (ref 80–94)
PDW BLD-RTO: 12.7 % (ref 11.5–14.5)
PLATELET # BLD: 372 THOU/MM3 (ref 130–400)
PMV BLD AUTO: 6.8 FL (ref 7.4–10.4)
POTASSIUM SERPL-SCNC: 4.4 MEQ/L (ref 3.5–5.2)
RBC # BLD: 4.85 MILL/MM3 (ref 4.7–6.1)
SODIUM BLD-SCNC: 130 MEQ/L (ref 135–145)
WBC # BLD: 11.1 THOU/MM3 (ref 4.8–10.8)

## 2018-03-06 PROCEDURE — 6360000002 HC RX W HCPCS: Performed by: NEUROLOGICAL SURGERY

## 2018-03-06 PROCEDURE — 85027 COMPLETE CBC AUTOMATED: CPT

## 2018-03-06 PROCEDURE — 1200000000 HC SEMI PRIVATE

## 2018-03-06 PROCEDURE — 36415 COLL VENOUS BLD VENIPUNCTURE: CPT

## 2018-03-06 PROCEDURE — 99233 SBSQ HOSP IP/OBS HIGH 50: CPT | Performed by: INTERNAL MEDICINE

## 2018-03-06 PROCEDURE — 80048 BASIC METABOLIC PNL TOTAL CA: CPT

## 2018-03-06 PROCEDURE — 6360000002 HC RX W HCPCS: Performed by: INTERNAL MEDICINE

## 2018-03-06 PROCEDURE — 2580000003 HC RX 258: Performed by: INTERNAL MEDICINE

## 2018-03-06 PROCEDURE — 6370000000 HC RX 637 (ALT 250 FOR IP): Performed by: NEUROLOGICAL SURGERY

## 2018-03-06 PROCEDURE — 97110 THERAPEUTIC EXERCISES: CPT

## 2018-03-06 RX ORDER — LIDOCAINE HYDROCHLORIDE 10 MG/ML
5 INJECTION, SOLUTION EPIDURAL; INFILTRATION; INTRACAUDAL; PERINEURAL ONCE
Status: DISCONTINUED | OUTPATIENT
Start: 2018-03-06 | End: 2018-03-07 | Stop reason: HOSPADM

## 2018-03-06 RX ORDER — SODIUM CHLORIDE 0.9 % (FLUSH) 0.9 %
10 SYRINGE (ML) INJECTION EVERY 12 HOURS SCHEDULED
Status: DISCONTINUED | OUTPATIENT
Start: 2018-03-06 | End: 2018-03-07 | Stop reason: HOSPADM

## 2018-03-06 RX ORDER — SODIUM CHLORIDE 0.9 % (FLUSH) 0.9 %
10 SYRINGE (ML) INJECTION PRN
Status: DISCONTINUED | OUTPATIENT
Start: 2018-03-06 | End: 2018-03-07 | Stop reason: HOSPADM

## 2018-03-06 RX ADMIN — OXYCODONE HYDROCHLORIDE 10 MG: 5 TABLET ORAL at 09:11

## 2018-03-06 RX ADMIN — CEFAZOLIN SODIUM 2 G: 2 SOLUTION INTRAVENOUS at 18:49

## 2018-03-06 RX ADMIN — OXYCODONE HYDROCHLORIDE 10 MG: 5 TABLET ORAL at 17:36

## 2018-03-06 RX ADMIN — FAMOTIDINE 20 MG: 20 TABLET, FILM COATED ORAL at 19:40

## 2018-03-06 RX ADMIN — CEFAZOLIN SODIUM 2 G: 2 SOLUTION INTRAVENOUS at 03:33

## 2018-03-06 RX ADMIN — METHOCARBAMOL 750 MG: 500 TABLET ORAL at 13:28

## 2018-03-06 RX ADMIN — OXYCODONE HYDROCHLORIDE 10 MG: 5 TABLET ORAL at 04:59

## 2018-03-06 RX ADMIN — OXYCODONE HYDROCHLORIDE 10 MG: 5 TABLET ORAL at 13:27

## 2018-03-06 RX ADMIN — OXYCODONE HYDROCHLORIDE 10 MG: 10 TABLET, FILM COATED, EXTENDED RELEASE ORAL at 20:59

## 2018-03-06 RX ADMIN — CEFAZOLIN SODIUM 2 G: 2 SOLUTION INTRAVENOUS at 11:50

## 2018-03-06 RX ADMIN — OXYCODONE HYDROCHLORIDE 10 MG: 5 TABLET ORAL at 22:08

## 2018-03-06 RX ADMIN — Medication 10 ML: at 09:14

## 2018-03-06 RX ADMIN — DOCUSATE SODIUM 100 MG: 100 CAPSULE ORAL at 19:40

## 2018-03-06 RX ADMIN — OXYCODONE HYDROCHLORIDE 10 MG: 10 TABLET, FILM COATED, EXTENDED RELEASE ORAL at 00:18

## 2018-03-06 RX ADMIN — DOCUSATE SODIUM 100 MG: 100 CAPSULE ORAL at 09:11

## 2018-03-06 RX ADMIN — FAMOTIDINE 20 MG: 20 TABLET, FILM COATED ORAL at 09:17

## 2018-03-06 RX ADMIN — OXYCODONE HYDROCHLORIDE 10 MG: 10 TABLET, FILM COATED, EXTENDED RELEASE ORAL at 09:11

## 2018-03-06 RX ADMIN — Medication 10 ML: at 18:49

## 2018-03-06 RX ADMIN — HYDROMORPHONE HYDROCHLORIDE 0.5 MG: 1 INJECTION, SOLUTION INTRAMUSCULAR; INTRAVENOUS; SUBCUTANEOUS at 03:33

## 2018-03-06 RX ADMIN — METHOCARBAMOL 750 MG: 500 TABLET ORAL at 19:40

## 2018-03-06 RX ADMIN — ENOXAPARIN SODIUM 40 MG: 40 INJECTION SUBCUTANEOUS at 11:49

## 2018-03-06 ASSESSMENT — PAIN SCALES - GENERAL
PAINLEVEL_OUTOF10: 9
PAINLEVEL_OUTOF10: 9
PAINLEVEL_OUTOF10: 10
PAINLEVEL_OUTOF10: 9
PAINLEVEL_OUTOF10: 10
PAINLEVEL_OUTOF10: 7
PAINLEVEL_OUTOF10: 10
PAINLEVEL_OUTOF10: 0
PAINLEVEL_OUTOF10: 6
PAINLEVEL_OUTOF10: 9

## 2018-03-06 ASSESSMENT — PAIN DESCRIPTION - ONSET
ONSET: ON-GOING
ONSET: ON-GOING

## 2018-03-06 ASSESSMENT — PAIN DESCRIPTION - ORIENTATION
ORIENTATION: POSTERIOR

## 2018-03-06 ASSESSMENT — PAIN DESCRIPTION - PAIN TYPE
TYPE: SURGICAL PAIN

## 2018-03-06 ASSESSMENT — PAIN DESCRIPTION - LOCATION
LOCATION: BACK;NECK
LOCATION: NECK
LOCATION: BACK;NECK
LOCATION: NECK
LOCATION: BACK;NECK

## 2018-03-06 ASSESSMENT — PAIN DESCRIPTION - DESCRIPTORS
DESCRIPTORS: ACHING

## 2018-03-06 ASSESSMENT — PAIN DESCRIPTION - FREQUENCY
FREQUENCY: CONTINUOUS
FREQUENCY: CONTINUOUS

## 2018-03-06 NOTE — PROGRESS NOTES
Hospitalist Progress Note    Patient:  Lexis Artist      Unit/Bed:7K-06/006-A    YOB: 1960    MRN: [id number]       Acct: [de-identified]     PCP: No primary care provider on file. Date of Admission: 3/1/2018    Chief Complaint:Right neck and shoulder pain     Subjective: doing fine  Wanting go home  Not to a [facility]  Spoke to dr Sam Mcgovern    Medications:  Reviewed    Infusion Medications     Scheduled Medications    lidocaine 1 % injection  5 mL Intradermal Once    sodium chloride flush  10 mL Intravenous 2 times per day    oxyCODONE  10 mg Oral 2 times per day    ceFAZolin  2 g Intravenous Q8H    docusate sodium  100 mg Oral BID    famotidine  20 mg Oral BID    enoxaparin  40 mg Subcutaneous Daily    influenza virus vaccine  0.5 mL Intramuscular Once     PRN Meds: sodium chloride flush, HYDROmorphone **OR** HYDROmorphone, acetaminophen, acetaminophen, oxyCODONE **OR** oxyCODONE, methocarbamol, ondansetron      Intake/Output Summary (Last 24 hours) at 03/06/18 1824  Last data filed at 03/06/18 1740   Gross per 24 hour   Intake              930 ml   Output             3204 ml   Net            -2274 ml       Diet:  DIET CARB CONTROL; Carb Control: 4 carbs/meal (approximate 1800 kcals/day)  Dietary Nutrition Supplements: Diabetic Oral Supplement    Exam:  /83   Pulse 90   Temp 98.2 °F (36.8 °C) (Oral)   Resp 18   Ht 6' 3\" (1.905 m)   Wt 190 lb 4.8 oz (86.3 kg)   SpO2 93%   BMI 23.79 kg/m²     General appearanc: mild distress due to pain   HEENT: Pupils equal, round, and reactive to light. Conjunctivae/corneas clear. Neck: posterior neck in dressing, drain in place with small amount of sanguinous output   Respiratory:  Normal respiratory effort. Clear to auscultation, bilaterally without Rales/Wheezes/Rhonchi. Cardiovascular: Regular rate and rhythm with normal S1/S2 without murmurs, rubs or gallops. Abdomen: Soft, non-tender, non-distended with normal bowel sounds.   Musculoskeletal: tenderness over right sided of posterior neck   Skin: Skin color, texture, turgor normal.  No rashes or lesions. Neurologic:  Neurovascularly intact without any focal sensory/motor deficits. Cranial nerves: II-XII intact, grossly non-focal.  Psychiatric: Alert and oriented        Labs:   Recent Labs      03/04/18   0410  03/06/18   0644   WBC  17.3*  11.1*   HGB  13.3*  15.4   HCT  39.6*  45.1   PLT  326  372     Recent Labs      03/04/18   0410  03/05/18   0611  03/06/18   0644   NA  135  134*  130*   K  4.7  4.4  4.4   CL  99  94*  91*   CO2  26  30  26   BUN  18  16  17   CREATININE  0.8  0.9  0.9   CALCIUM  9.1  9.6  9.5     No results for input(s): AST, ALT, BILIDIR, BILITOT, ALKPHOS in the last 72 hours. No results for input(s): INR in the last 72 hours. No results for input(s): Marylene Goad in the last 72 hours. Urinalysis:    No results found for: Napolean Officer, BACTERIA, RBCUA, BLOODU, SPECGRAV, [address]    Radiology:  XR CERVICAL SPINE (2-3 VIEWS)   Final Result    Intraoperative localizing images. **This report has been created using voice recognition software. It may contain minor errors which are inherent in voice recognition technology. **      Final report electronically signed by Dr. Mehul Metcalf on 3/3/2018 10:24 AM      FLUORO FOR SURGICAL PROCEDURES   Final Result      MRI CERVICAL SPINE W WO CONTRAST   Final Result         1. Septic arthritis of the right facet joint of the C4-5 level with an associated 4.7 cm in length epidural abscess in the dorsal and right lateral aspects of the cervical spinal canal causing severe spinal canal at the C4-5 level. There is also a large    abscess in the posterior paraspinal musculature on the right. There is severe right foraminal stenosis at the C4-5 level. 2. Moderate-severe spinal canal stenosis at the C3-4 level due to a combination of the epidural abscess and degenerative changes.       3. Significant spinal canal stenosis at the C5-6 and C6-7 levels which is mostly due to degenerative changes. These findings were telephoned to the patient's nurse, at 9:55 AM on 3/2/2018. I am waiting for a return call from Dr. Cheryle Childress. **This report has been created using voice recognition software. It may contain minor errors which are inherent in voice recognition technology. **      Final report electronically signed by Dr. Agnes Bennett on 3/2/2018 10:05 AM      CT RECONSTRUCTION WO POST PROCESS   Final Result   1. No acute fractures. 2.  Discogenic and hypertrophic cervical spine degenerative changes as described level by level most notably at C5-C6 and C6-C7.   3.  Intramuscular abscess right paraspinous neck musculature as described on the CT scan soft tissue neck of same day. Please refer to that report. **This report has been created using voice recognition software. It may contain minor errors which are inherent in voice recognition technology. **      Final report electronically signed by Dr. Miranda Shi on 3/1/2018 10:06 PM      CT SOFT TISSUE NECK W CONTRAST   Final Result   1. There is an intramuscular abscess in the muscle or muscle groups medial to the right levatore scapulae. This collection appears to be multiloculated and contains at least 2 small gas bubbles. 2.  Increased density in the right epidural space from C2 down to the C5 level. This is worrisome for enhancement secondary to epidural inflammatory changes, or epidural collection. Recommend MRI of the cervical spine with IV contrast.         **This report has been created using voice recognition software. It may contain minor errors which are inherent in voice recognition technology. **      Final report electronically signed by Dr. Miranda Shi on 3/1/2018 9:59 PM      XR CHEST PORTABLE   Final Result      No acute cardiopulmonary disease. **This report has been created using voice recognition software.  It may contain minor errors

## 2018-03-06 NOTE — PLAN OF CARE
Problem: Cardiovascular  Goal: No DVT, peripheral vascular complications  Outcome: Ongoing  Pt given lovenox. No complaints of calf pain. Problem: GI  Goal: No bowel complications  Outcome: Ongoing  Pt passing gas. No bm today. Problem: Nutrition  Goal: Optimal nutrition therapy  Outcome: Ongoing  Pt eating % of meals. Dietician met with pt today. Problem: Skin Integrity/Risk  Goal: No skin breakdown during hospitalization  Outcome: Ongoing  Surgical dressing change today. No new skin break down this shift. Problem: Musculor/Skeletal Functional Status  Goal: Absence of falls  Outcome: Ongoing  Patient free from falls this shift. Bed in low position with wheels locked and side rails up x2. Call light in reach. Will continue to monitor. Problem: Falls - Risk of  Goal: Absence of falls  Outcome: Ongoing  Patient free from falls this shift. Bed in low position with wheels locked and side rails up x2. Call light in reach. Will continue to monitor. Problem: Discharge Planning:  Goal: Discharged to appropriate level of care  Discharged to appropriate level of care   Outcome: Ongoing  Pt remains on 7K, discharge planning in progress. Pt plans to go home and sister will assist pt. Comments: Care plan reviewed with patient. Patient verbalize understanding of the plan of care and contribute to goal setting.

## 2018-03-06 NOTE — PROGRESS NOTES
[address] [place] [address]                                   PROGRESS NOTE    PATIENT NAME: Burton Juarez                      :        1960  MED REC NO:   [id number]                           ROOM:       0006  ACCOUNT NO:   [de-identified]                           ADMIT DATE: 2018  PROVIDER:     Telly Reid M.D. NEUROSURGERY PROGRESS NOTE    TIME OF SERVICE:  2015 hours    The patient was seen this evening on 2018 on the 7K unit. He is able  to lift his right arm up using his left arm until the right arm is above  his head and then when he let it go, he can hold it there, but not for very  long. This appears to be an improvement from the preoperative status. The  incisional pain is controlled. The drain, Hemovac can come out either  later this evening or in the morning. From my standpoint, the patient will  be able to be discharged soon with antibiotics per the Infectious Disease  Service. The cultures are showing that there was Staphylococcus aureus in  the wound at the epidural space. The pathology report indicated that the  patient did not have abscess, but with the cultures showing growth from the  tissue that I took from epidural space, that in my opinion that this was  abscess and he should be treated appropriately.   I will continue to follow  as needed while he is here and then after discharge, follow up in my office  in about two to three weeks for staple removal.        Dominique Goode M.D.    D: 2018 5:11:55       T: 2018 6:53:44     [email or web address]  Job#: [id number]     Doc#: [id number]    CC:

## 2018-03-06 NOTE — PLAN OF CARE
Problem: Pain:  Goal: Pain level will decrease  Pain level will decrease    Outcome: Ongoing  Patient I rating post-op posterior neck pain \"8-10\". After receiving pain medication, he was able to rest . However his not yet achieved his pain goal of \"3\" this shift. Problem: Neurological  Goal: Maximum potential motor/sensory/cognitive function  Outcome: Ongoing  Patient still with weakness in right upper arm with minimal improvement since surgery. Hand grasps are equal and fairly strong but has some difficulty raising that arm. Problem: Cardiovascular  Goal: No DVT, peripheral vascular complications  Outcome: Ongoing  Patient denies calf pain or tenderness. He is out of bed with assistance. Problem: GI  Goal: No bowel complications  Outcome: Ongoing  Patient has not had a bm since his surgery. He is compliant with taking prescribed stool softeners. Patient is passing gas. Problem: Nutrition  Goal: Optimal nutrition therapy  Outcome: Ongoing  Patient reports improvement in appetite. Taking fluids well. Problem: Skin Integrity/Risk  Goal: No skin breakdown during hospitalization  Outcome: Met This Shift  No areas of skin breakdown noted. Problem: Musculor/Skeletal Functional Status  Goal: Absence of falls  Outcome: Ongoing  Call light is within reach and he uses it when assistance is needed. He is compliant with wearing non-skid slippers when out of bed. Patient verbalizes understanding of fall precautions when discussed. Problem: Falls - Risk of  Goal: Absence of falls  Outcome: Ongoing  Call light is within reach and he uses it when assistance is needed. He is compliant with wearing non-skid slippers when out of bed. Patient verbalizes understanding of fall precautions when discussed.     Problem: Discharge Planning:  Goal: Discharged to appropriate level of care  Discharged to appropriate level of care   Outcome: Ongoing  Patient wants to return home with [facility] and home health to provide

## 2018-03-06 NOTE — PROGRESS NOTES
Nutrition Assessment    Type and Reason for Visit: Reassess (po & supplement monitor)    Nutrition Recommendations: recommend probiotic. Continue regular wt checks. Continue to monitor glucose, Na++. Recommend MVI when pt can tolerate. Malnutrition Assessment:  · Malnutrition Status: Insufficient data  · Context: Acute illness or injury  Nutrition Diagnosis:   · Problem: Inadequate oral intake  · Etiology: related to Insufficient energy/nutrient consumption     Signs and symptoms:  as evidenced by Diet history of poor intake    Nutrition Assessment:  · Subjective Assessment: Pt admitted with paraspinal abscess, s/p OR 3/1 seen & reports eating good now, to include proteins to include omelet for breakfast, pork roast for lunch & drinking milk. Per pt he has not received Glucerna yet, but eating good now. Reorded  Glucerna. Pt reports like [facility] food, but does not want to over do it becase he lost 30# in 2009 which he wanted to. Discuss with pt glucose control & per pt he is not DB, but his glucose pops up when he is under stress & understands hero he needs to watch this. Pt reports no bm since last Friday, but they are giving him bowel meds & he is eating fruit. Discussed benefit of yoogurt as well for probotics. Pt does report he will be on IV antibiotics for 6 weeks.   Na+ 506, glicose 358, WBC 65.4  · Wound Type:  (OR- laminectomy with drainage of abscess)  · Current Nutrition Therapies:  · Oral Diet Orders:  (DB CHO control)   · Oral Diet intake: 51-75%  · Oral Nutrition Supplement (ONS) Orders: Diabetic Oral Supplement (Glucerna bid)  · ONS intake:  (starting)  · Anthropometric Measures:  · Ht: 6' 3\" (190.5 cm)   · Current Body Wt: 190 lb 4.1 oz (86.3 kg) (3/5 no edema)  · Admission Body Wt: 202 lb 2 oz (91.7 kg) (3/2, no edema)  · Usual Body Wt:  (per EMR: 215# -stated; 2/22; per pt. ~215# - thinks weighed this a week ago (no PCP to confirm))  · % Weight Change: reported 6%,  1 week, u/a to

## 2018-03-06 NOTE — PLAN OF CARE
Problem: Pain:  Goal: Pain level will decrease  Pain level will decrease    Outcome: Ongoing  Pt rated pain 10 out of 10. PRN pain medication offered when available. White board updated when given. Pt verbalized understanding of available pain medications. Problem: Neurological  Goal: Maximum potential motor/sensory/cognitive function  Outcome: Ongoing  Pt participated with PT/OT.

## 2018-03-06 NOTE — PROGRESS NOTES
Pain:  Pain Assessment  Patient Currently in Pain: Yes (10/10 Pain in neck and between shoulder blades. Pt reported RN already given pain medicationi Pt is able to talk and hold conversation with out pained expressionin voice)  Pain Assessment: Faces     Objective       Bed mobility  Comment: declines EOB activity. Pt reports about blacking out when he sits up        Additional Activities: skilled instruction on cervical precautions and precautions written on white board for improved recall. Pt reported he has been turning neck. Comment: Pt tolerated AAROM R Shoulder flexion less than 90 degrees. AROM/AARAOM elbow felxion, digit flexion. Pt rpeorted using LUE to Raise RUE while in bed. Therapist gave skilled instruction respecting pain and not over doing self exercises. Activity Tolerance:   Pt limited by pain      Assessment:     Performance deficits / Impairments: Decreased functional mobility , Decreased ADL status, Decreased ROM, Decreased strength, Decreased safe awareness, Decreased endurance, Decreased balance, Decreased high-level IADLs  Prognosis: Fair  Discharge Recommendations: Patient would benefit from continued therapy after discharge, Continue to assess pending progress    Patient Education:  Patient Education:  Cervical precautions, respect pain, Exercise safety    Equipment Recommendations:   Other: TBD pending progress; will continue to assess     Safety:  Safety Devices in place: Yes  Type of devices: Bed alarm in place, Call light within reach, Left in bed, Nurse notified (Pt refused to wear gait belt. )    Plan:  Times per week: 6x  Current Treatment Recommendations: Strengthening, ROM, Balance Training, Functional Mobility Training, Endurance Training, Patient/Caregiver Education & Training, Equipment Evaluation, Education, & procurement, Safety Education & Training, Self-Care / ADL    Goals:  Patient goals : \"go home\"    Short term goals  Time Frame for Short term goals: 2

## 2018-03-07 VITALS
SYSTOLIC BLOOD PRESSURE: 141 MMHG | BODY MASS INDEX: 23.66 KG/M2 | TEMPERATURE: 98.6 F | WEIGHT: 190.3 LBS | HEIGHT: 75 IN | OXYGEN SATURATION: 94 % | DIASTOLIC BLOOD PRESSURE: 84 MMHG | HEART RATE: 90 BPM | RESPIRATION RATE: 16 BRPM

## 2018-03-07 LAB
BLOOD CULTURE, ROUTINE: NORMAL
BLOOD CULTURE, ROUTINE: NORMAL

## 2018-03-07 PROCEDURE — G0008 ADMIN INFLUENZA VIRUS VAC: HCPCS | Performed by: INTERNAL MEDICINE

## 2018-03-07 PROCEDURE — 36569 INSJ PICC 5 YR+ W/O IMAGING: CPT

## 2018-03-07 PROCEDURE — 05H433Z INSERTION OF INFUSION DEVICE INTO LEFT INNOMINATE VEIN, PERCUTANEOUS APPROACH: ICD-10-PCS | Performed by: INTERNAL MEDICINE

## 2018-03-07 PROCEDURE — 76937 US GUIDE VASCULAR ACCESS: CPT

## 2018-03-07 PROCEDURE — 97110 THERAPEUTIC EXERCISES: CPT

## 2018-03-07 PROCEDURE — 90686 IIV4 VACC NO PRSV 0.5 ML IM: CPT | Performed by: INTERNAL MEDICINE

## 2018-03-07 PROCEDURE — 6360000002 HC RX W HCPCS: Performed by: NEUROLOGICAL SURGERY

## 2018-03-07 PROCEDURE — 6360000002 HC RX W HCPCS: Performed by: INTERNAL MEDICINE

## 2018-03-07 PROCEDURE — C1751 CATH, INF, PER/CENT/MIDLINE: HCPCS

## 2018-03-07 PROCEDURE — A4212 NON CORING NEEDLE OR STYLET: HCPCS

## 2018-03-07 PROCEDURE — 99239 HOSP IP/OBS DSCHRG MGMT >30: CPT | Performed by: INTERNAL MEDICINE

## 2018-03-07 PROCEDURE — 6370000000 HC RX 637 (ALT 250 FOR IP): Performed by: NEUROLOGICAL SURGERY

## 2018-03-07 PROCEDURE — 2580000003 HC RX 258: Performed by: INTERNAL MEDICINE

## 2018-03-07 PROCEDURE — 97116 GAIT TRAINING THERAPY: CPT

## 2018-03-07 RX ORDER — PSEUDOEPHEDRINE HCL 30 MG
100 TABLET ORAL 2 TIMES DAILY PRN
Qty: 60 CAPSULE | Refills: 0 | Status: SHIPPED | OUTPATIENT
Start: 2018-03-07 | End: 2018-04-11 | Stop reason: ALTCHOICE

## 2018-03-07 RX ORDER — OXYCODONE HYDROCHLORIDE 5 MG/1
5 TABLET ORAL EVERY 4 HOURS PRN
Qty: 10 TABLET | Refills: 0 | Status: SHIPPED | OUTPATIENT
Start: 2018-03-07 | End: 2018-03-14

## 2018-03-07 RX ADMIN — METHOCARBAMOL 750 MG: 500 TABLET ORAL at 03:25

## 2018-03-07 RX ADMIN — CEFAZOLIN SODIUM 2 G: 2 SOLUTION INTRAVENOUS at 03:25

## 2018-03-07 RX ADMIN — METHOCARBAMOL 750 MG: 500 TABLET ORAL at 10:58

## 2018-03-07 RX ADMIN — FAMOTIDINE 20 MG: 20 TABLET, FILM COATED ORAL at 08:12

## 2018-03-07 RX ADMIN — DOCUSATE SODIUM 100 MG: 100 CAPSULE ORAL at 08:12

## 2018-03-07 RX ADMIN — ENOXAPARIN SODIUM 40 MG: 40 INJECTION SUBCUTANEOUS at 10:57

## 2018-03-07 RX ADMIN — OXYCODONE HYDROCHLORIDE 10 MG: 5 TABLET ORAL at 08:12

## 2018-03-07 RX ADMIN — OXYCODONE HYDROCHLORIDE 10 MG: 5 TABLET ORAL at 12:58

## 2018-03-07 RX ADMIN — Medication 10 ML: at 08:13

## 2018-03-07 RX ADMIN — INFLUENZA A VIRUS A/SINGAPORE/GP1908/2015 IVR-180A (H1N1) ANTIGEN (PROPIOLACTONE INACTIVATED), INFLUENZA A VIRUS A/HONG KONG/4801/2014 X-263B (H3N2) ANTIGEN (PROPIOLACTONE INACTIVATED), INFLUENZA B VIRUS B/BRISBANE/46/2015 ANTIGEN (PROPIOLACTONE INACTIVATED), AND INFLUENZA B VIRUS B/PHUKET/3073/2013 BVR-1B ANTIGEN (PROPIOLACTONE INACTIVATED) 0.5 ML: 15; 15; 15; 15 INJECTION, SUSPENSION INTRAMUSCULAR at 10:57

## 2018-03-07 RX ADMIN — OXYCODONE HYDROCHLORIDE 10 MG: 5 TABLET ORAL at 03:25

## 2018-03-07 RX ADMIN — OXYCODONE HYDROCHLORIDE 10 MG: 5 TABLET ORAL at 16:59

## 2018-03-07 RX ADMIN — CEFAZOLIN SODIUM 2 G: 2 SOLUTION INTRAVENOUS at 15:04

## 2018-03-07 RX ADMIN — CEFAZOLIN SODIUM 2 G: 2 SOLUTION INTRAVENOUS at 14:00

## 2018-03-07 ASSESSMENT — PAIN DESCRIPTION - PAIN TYPE
TYPE: SURGICAL PAIN
TYPE: SURGICAL PAIN

## 2018-03-07 ASSESSMENT — PAIN SCALES - GENERAL
PAINLEVEL_OUTOF10: 10
PAINLEVEL_OUTOF10: 7

## 2018-03-07 ASSESSMENT — PAIN DESCRIPTION - FREQUENCY
FREQUENCY: CONTINUOUS
FREQUENCY: CONTINUOUS

## 2018-03-07 ASSESSMENT — PAIN DESCRIPTION - DESCRIPTORS
DESCRIPTORS: ACHING
DESCRIPTORS: ACHING;HEADACHE

## 2018-03-07 ASSESSMENT — PAIN DESCRIPTION - ONSET
ONSET: ON-GOING
ONSET: ON-GOING

## 2018-03-07 ASSESSMENT — PAIN DESCRIPTION - ORIENTATION
ORIENTATION: POSTERIOR
ORIENTATION: POSTERIOR

## 2018-03-07 ASSESSMENT — PAIN DESCRIPTION - LOCATION
LOCATION: NECK
LOCATION: NECK

## 2018-03-07 ASSESSMENT — PAIN DESCRIPTION - PROGRESSION: CLINICAL_PROGRESSION: NOT CHANGED

## 2018-03-07 NOTE — PROGRESS NOTES
Date 3-6 2018 time 10:15 PM  The patient still has weakness of the proximal right upper extremity. Incisional pain is fairly well-controlled. He is on anabiotics per the ID stuff for a methicillin sensitive staph aureus. After discharge follow up will be in my office in 2 to 3 weeks time.  Anticipate discharge in the next 1 to 2 days

## 2018-03-07 NOTE — PROGRESS NOTES
Patient and sister educated on discharge instructions, follow up appointments and medications. Patient and sister voice good understanding and all questions addressed at this time. Patient assisted to discharge lobby by nurse with all personal belongings.

## 2018-03-07 NOTE — PROGRESS NOTES
reporting he is going home today and will complete his ADL tasks at home. Pt wanting to work on funciton of right UE. Pain:          Objective                                                                      ADL  Additional Comments: Pt declining to complete any ADL tasks at this time stating he will complete upon return home. Comment: Pt educated on complete supine dowel diaz ex in chest press and shoulder flexion with pt demo follow through of 10 reps of each ex with encouragement to finish. Pt reporting fatigue but no increase in pain during. Pt educated on completing seated EOB table slides in shoulder flex wiht pt compelting 5 reps prior to hjaving increased c/o pain in neck. Pt requiring restbreaks throughout each ex d/t fatigue. Pt eudcated on compelting isometric strengthening ex in sholder flex and extension to icnrease shoulder strength for increased active movement during ADL tasks. Pt verbalized understanding of HEP and agreed to complete 1x day. Ex written down on paper for pt to refer to after. safety/precautions to increase ease with ADL completion  Long term goals  Time Frame for Long term goals : not set due to ELOS

## 2018-03-07 NOTE — PLAN OF CARE
rails up 2/4/, call light within reach. Non-skid socks on. Problem: Discharge Planning:  Goal: Discharged to appropriate level of care  Discharged to appropriate level of care   Outcome: Ongoing  Patient plans on discharge to home after PICC line inserted today. Comments: Care plan reviewed with patient. Patient verbalize understanding of the plan of care and contribute to goal setting.

## 2018-03-07 NOTE — PROGRESS NOTES
for Short term goals: 2 weeks  Short term goal 1: Pt to transfer supine <--> sit S to enable pt to get in/out of bed. Short term goal 2: Pt to transfer sit <--> stand S for increased functional mobility. Short term goal 3: Pt to ambulate >200 feet without AD S for household ambulation. Short term goal 4: Pt to ascend/descend 2 steps with L HR SBA for home entry. Long term goals  Time Frame for Long term goals : NA due to short length of stay.

## 2018-03-08 ENCOUNTER — HOSPITAL ENCOUNTER (OUTPATIENT)
Age: 58
Setting detail: SPECIMEN
Discharge: HOME | End: 2018-03-08
Payer: MEDICAID

## 2018-03-08 LAB
AEROBIC CULTURE: ABNORMAL
ALBUMIN SERPL-MCNC: 3.3 G/DL (ref 3.5–5.1)
ALP BLD-CCNC: 123 U/L (ref 38–126)
ALT SERPL-CCNC: 11 U/L (ref 11–66)
ANAEROBIC CULTURE: ABNORMAL
ANION GAP SERPL CALCULATED.3IONS-SCNC: 16 MEQ/L (ref 8–16)
AST SERPL-CCNC: 22 U/L (ref 5–40)
BILIRUB SERPL-MCNC: 0.5 MG/DL (ref 0.3–1.2)
BUN BLDV-MCNC: 25 MG/DL (ref 7–22)
C-REACTIVE PROTEIN: 8.95 MG/DL (ref 0–1)
CALCIUM SERPL-MCNC: 10 MG/DL (ref 8.5–10.5)
CHLORIDE BLD-SCNC: 88 MEQ/L (ref 98–111)
CO2: 27 MEQ/L (ref 23–33)
CREAT SERPL-MCNC: 0.8 MG/DL (ref 0.4–1.2)
GFR SERPL CREATININE-BSD FRML MDRD: > 90 ML/MIN/1.73M2
GLUCOSE BLD-MCNC: 145 MG/DL (ref 70–108)
GRAM STAIN RESULT: ABNORMAL
HCT VFR BLD CALC: 44.8 % (ref 42–52)
HEMOGLOBIN: 14.7 GM/DL (ref 14–18)
MCH RBC QN AUTO: 30.2 PG (ref 27–31)
MCHC RBC AUTO-ENTMCNC: 32.9 GM/DL (ref 33–37)
MCV RBC AUTO: 91.9 FL (ref 80–94)
ORGANISM: ABNORMAL
PDW BLD-RTO: 12.7 % (ref 11.5–14.5)
PLATELET # BLD: 338 THOU/MM3 (ref 130–400)
PMV BLD AUTO: 7 FL (ref 7.4–10.4)
POTASSIUM SERPL-SCNC: 5.8 MEQ/L (ref 3.5–5.2)
RBC # BLD: 4.87 MILL/MM3 (ref 4.7–6.1)
SODIUM BLD-SCNC: 131 MEQ/L (ref 135–145)
TOTAL PROTEIN: 8.1 G/DL (ref 6.1–8)
WBC # BLD: 10.5 THOU/MM3 (ref 4.8–10.8)

## 2018-03-08 PROCEDURE — 85027 COMPLETE CBC AUTOMATED: CPT

## 2018-03-08 PROCEDURE — 86140 C-REACTIVE PROTEIN: CPT

## 2018-03-08 PROCEDURE — 80053 COMPREHEN METABOLIC PANEL: CPT

## 2018-03-09 NOTE — DISCHARGE SUMMARY
Hospitalist discharge Note    Patient:  Byron Fernandez      Unit/Bed:7K-06/006-A    YOB: 1960    MRN: [id number]       Acct: [de-identified]     PCP: Kaci Abbott DO    Date of Admission: 3/1/2018    Chief Complaint:Right neck and shoulder pain     Subjective: doing fine  Wanting go home  Not to a [facility]  Spoke to dr Talya Ortiz ordered  picc line placed , ancef for 6 weeks is the plan    Medications:  Reviewed       Medication List      START taking these medications    ceFAZolin infusion  Commonly known as:  ANCEF  Infuse 2,000 mg intravenously every 8 hours Compound per protocol     docusate 100 MG Caps  Commonly known as:  COLACE, DULCOLAX  Take 100 mg by mouth 2 times daily as needed for Constipation     oxyCODONE 5 MG immediate release tablet  Commonly known as:  ROXICODONE  Take 1 tablet by mouth every 4 hours as needed for Pain for up to 7 days. STOP taking these medications    aspirin 325 MG tablet     diazepam 5 MG tablet  Commonly known as:  VALIUM     ibuprofen 800 MG tablet  Commonly known as:  ADVIL;MOTRIN           Where to Get Your Medications      These medications were sent to [address] [place] [address], [address], [address] [place] [address], [address] [id number]    Phone:  [phone]   · docusate 100 MG Caps     You can get these medications from any pharmacy    Bring a paper prescription for each of these medications  · ceFAZolin infusion  · oxyCODONE 5 MG immediate release tablet         No intake or output data in the 24 hours ending 03/09/18 1315    Diet:  Dietary Nutrition Supplements: Diabetic Oral Supplement    Exam:  BP (!) 141/84   Pulse 90   Temp 98.6 °F (37 °C) (Oral)   Resp 16   Ht 6' 3\" (1.905 m)   Wt 190 lb 4.8 oz (86.3 kg)   SpO2 94%   BMI 23.79 kg/m²     General appearanc: mild distress due to pain   HEENT: Pupils equal, round, and reactive to light. Conjunctivae/corneas clear.   Neck: posterior neck on the right. There is severe right foraminal stenosis at the C4-5 level. 2. Moderate-severe spinal canal stenosis at the C3-4 level due to a combination of the epidural abscess and degenerative changes. 3. Significant spinal canal stenosis at the C5-6 and C6-7 levels which is mostly due to degenerative changes. These findings were telephoned to the patient's nurse, at 9:55 AM on 3/2/2018. I am waiting for a return call from Dr. Nati Lino. **This report has been created using voice recognition software. It may contain minor errors which are inherent in voice recognition technology. **      Final report electronically signed by Dr. Grady Sutherland on 3/2/2018 10:05 AM      CT RECONSTRUCTION WO POST PROCESS   Final Result   1. No acute fractures. 2.  Discogenic and hypertrophic cervical spine degenerative changes as described level by level most notably at C5-C6 and C6-C7.   3.  Intramuscular abscess right paraspinous neck musculature as described on the CT scan soft tissue neck of same day. Please refer to that report. **This report has been created using voice recognition software. It may contain minor errors which are inherent in voice recognition technology. **      Final report electronically signed by Dr. Cherry Patel on 3/1/2018 10:06 PM      CT SOFT TISSUE NECK W CONTRAST   Final Result   1. There is an intramuscular abscess in the muscle or muscle groups medial to the right levatore scapulae. This collection appears to be multiloculated and contains at least 2 small gas bubbles. 2.  Increased density in the right epidural space from C2 down to the C5 level. This is worrisome for enhancement secondary to epidural inflammatory changes, or epidural collection. Recommend MRI of the cervical spine with IV contrast.         **This report has been created using voice recognition software. It may contain minor errors which are inherent in voice recognition technology. ** Final report electronically signed by Dr. Cherry Patel on 3/1/2018 9:59 PM      XR CHEST PORTABLE   Final Result      No acute cardiopulmonary disease. **This report has been created using voice recognition software. It may contain minor errors which are inherent in voice recognition technology. **      Final report electronically signed by Dr. Neo Espinoza on 3/1/2018 8:14 PM          Diet: Dietary Nutrition Supplements: Diabetic Oral Supplement    DVT prophylaxis: [x] Lovenox                                 [] SCDs                                 [] SQ Heparin                                 [] Encourage ambulation           [] Already on Anticoagulation     Disposition:    [] Home       [] TCU       [] Rehab       [] Psych       [] SNF       [] [place]       [] Other-    Code Status: Prior    PT/OT Eval Status: ordered     Assessment/Plan:    Anticipated Discharge in : today    Active Hospital Problems    Diagnosis Date Noted    Staphylococcus infection [B95.8] 03/04/2018    Paraspinal abscess ([facility] [place] [address] [M46.20] 03/01/2018       1. PARASPINAL ABSCES: S/P POSTERIOR  B/L  C3-6 LAMINECTOMY, I&D-POD# 1. Wound culture growing coagulase-pos staph. Cont pain management. PT/OT. ID and NS following - Spoke to dr MACKEY [facility] [place] ordered  picc line placed , ancef for 6 weeks is the plan  2. CAD: hold ASA. Resume when ok with NS. BP elevated d/t pain and IVF, will d/c NS  3. DM2: cont SSI  4.  Anxiety: valium prn     Plans for picc and ancef for 6 weeks- [facility] dr [facility]      Discharge home - 35 mins        Electronically signed by Teo Cee MD on 3/9/2018 at 1:15 PM

## 2018-03-15 ENCOUNTER — HOSPITAL ENCOUNTER (OUTPATIENT)
Age: 58
Setting detail: SPECIMEN
Discharge: HOME | End: 2018-03-15
Payer: MEDICAID

## 2018-03-15 LAB
ALBUMIN SERPL-MCNC: 3.7 G/DL (ref 3.5–5.1)
ALP BLD-CCNC: 123 U/L (ref 38–126)
ALT SERPL-CCNC: < 5 U/L (ref 11–66)
ANION GAP SERPL CALCULATED.3IONS-SCNC: 14 MEQ/L (ref 8–16)
AST SERPL-CCNC: 16 U/L (ref 5–40)
BILIRUB SERPL-MCNC: 0.2 MG/DL (ref 0.3–1.2)
BUN BLDV-MCNC: 16 MG/DL (ref 7–22)
C-REACTIVE PROTEIN: 0.8 MG/DL (ref 0–1)
CALCIUM SERPL-MCNC: 9.7 MG/DL (ref 8.5–10.5)
CHLORIDE BLD-SCNC: 97 MEQ/L (ref 98–111)
CO2: 25 MEQ/L (ref 23–33)
CREAT SERPL-MCNC: 0.8 MG/DL (ref 0.4–1.2)
GFR SERPL CREATININE-BSD FRML MDRD: > 90 ML/MIN/1.73M2
GLUCOSE BLD-MCNC: 79 MG/DL (ref 70–108)
POTASSIUM SERPL-SCNC: 4.3 MEQ/L (ref 3.5–5.2)
SODIUM BLD-SCNC: 136 MEQ/L (ref 135–145)
TOTAL PROTEIN: 7.5 G/DL (ref 6.1–8)

## 2018-03-15 PROCEDURE — 86140 C-REACTIVE PROTEIN: CPT

## 2018-03-15 PROCEDURE — 80053 COMPREHEN METABOLIC PANEL: CPT

## 2018-03-22 ENCOUNTER — HOSPITAL ENCOUNTER (OUTPATIENT)
Age: 58
Setting detail: SPECIMEN
Discharge: HOME | End: 2018-03-22
Payer: MEDICAID

## 2018-03-22 LAB
ALBUMIN SERPL-MCNC: 3.8 G/DL (ref 3.5–5.1)
ALP BLD-CCNC: 115 U/L (ref 38–126)
ALT SERPL-CCNC: 6 U/L (ref 11–66)
ANION GAP SERPL CALCULATED.3IONS-SCNC: 17 MEQ/L (ref 8–16)
AST SERPL-CCNC: 20 U/L (ref 5–40)
BASOPHILS # BLD: 0.8 %
BASOPHILS ABSOLUTE: 0.1 THOU/MM3 (ref 0–0.1)
BILIRUB SERPL-MCNC: 0.2 MG/DL (ref 0.3–1.2)
BUN BLDV-MCNC: 19 MG/DL (ref 7–22)
C-REACTIVE PROTEIN: 0.46 MG/DL (ref 0–1)
CALCIUM SERPL-MCNC: 9.3 MG/DL (ref 8.5–10.5)
CHLORIDE BLD-SCNC: 97 MEQ/L (ref 98–111)
CO2: 21 MEQ/L (ref 23–33)
CREAT SERPL-MCNC: 0.8 MG/DL (ref 0.4–1.2)
EOSINOPHIL # BLD: 1.1 %
EOSINOPHILS ABSOLUTE: 0.1 THOU/MM3 (ref 0–0.4)
GFR SERPL CREATININE-BSD FRML MDRD: > 90 ML/MIN/1.73M2
GLUCOSE BLD-MCNC: 224 MG/DL (ref 70–108)
HCT VFR BLD CALC: 43.3 % (ref 42–52)
HEMOGLOBIN: 14.7 GM/DL (ref 14–18)
LYMPHOCYTES # BLD: 26.7 %
LYMPHOCYTES ABSOLUTE: 2.8 THOU/MM3 (ref 1–4.8)
MCH RBC QN AUTO: 31.3 PG (ref 27–31)
MCHC RBC AUTO-ENTMCNC: 34 GM/DL (ref 33–37)
MCV RBC AUTO: 92 FL (ref 80–94)
MONOCYTES # BLD: 6.7 %
MONOCYTES ABSOLUTE: 0.7 THOU/MM3 (ref 0.4–1.3)
NUCLEATED RED BLOOD CELLS: 0 /100 WBC
PDW BLD-RTO: 13.5 % (ref 11.5–14.5)
PLATELET # BLD: 303 THOU/MM3 (ref 130–400)
PMV BLD AUTO: 6.9 FL (ref 7.4–10.4)
POTASSIUM SERPL-SCNC: 5.2 MEQ/L (ref 3.5–5.2)
RBC # BLD: 4.7 MILL/MM3 (ref 4.7–6.1)
SEG NEUTROPHILS: 64.7 %
SEGMENTED NEUTROPHILS ABSOLUTE COUNT: 6.8 THOU/MM3 (ref 1.8–7.7)
SODIUM BLD-SCNC: 135 MEQ/L (ref 135–145)
TOTAL PROTEIN: 7.2 G/DL (ref 6.1–8)
WBC # BLD: 10.5 THOU/MM3 (ref 4.8–10.8)

## 2018-03-22 PROCEDURE — 86140 C-REACTIVE PROTEIN: CPT

## 2018-03-22 PROCEDURE — 80053 COMPREHEN METABOLIC PANEL: CPT

## 2018-03-22 PROCEDURE — 36415 COLL VENOUS BLD VENIPUNCTURE: CPT

## 2018-03-22 PROCEDURE — 85025 COMPLETE CBC W/AUTO DIFF WBC: CPT

## 2018-03-29 ENCOUNTER — HOSPITAL ENCOUNTER (OUTPATIENT)
Age: 58
Setting detail: SPECIMEN
Discharge: HOME | End: 2018-03-29
Payer: MEDICAID

## 2018-03-29 LAB
ALBUMIN SERPL-MCNC: 3.8 G/DL (ref 3.5–5.1)
ALP BLD-CCNC: 108 U/L (ref 38–126)
ALT SERPL-CCNC: < 5 U/L (ref 11–66)
ANION GAP SERPL CALCULATED.3IONS-SCNC: 16 MEQ/L (ref 8–16)
AST SERPL-CCNC: 13 U/L (ref 5–40)
BILIRUB SERPL-MCNC: 0.3 MG/DL (ref 0.3–1.2)
BUN BLDV-MCNC: 17 MG/DL (ref 7–22)
C-REACTIVE PROTEIN: 0.64 MG/DL (ref 0–1)
CALCIUM SERPL-MCNC: 9.7 MG/DL (ref 8.5–10.5)
CHLORIDE BLD-SCNC: 98 MEQ/L (ref 98–111)
CO2: 25 MEQ/L (ref 23–33)
CREAT SERPL-MCNC: 0.9 MG/DL (ref 0.4–1.2)
GFR SERPL CREATININE-BSD FRML MDRD: 86 ML/MIN/1.73M2
GLUCOSE BLD-MCNC: 180 MG/DL (ref 70–108)
HCT VFR BLD CALC: 40.5 % (ref 42–52)
HEMOGLOBIN: 13.5 GM/DL (ref 14–18)
MCH RBC QN AUTO: 30.9 PG (ref 27–31)
MCHC RBC AUTO-ENTMCNC: 33.2 GM/DL (ref 33–37)
MCV RBC AUTO: 92.9 FL (ref 80–94)
PDW BLD-RTO: 14 % (ref 11.5–14.5)
PLATELET # BLD: 194 THOU/MM3 (ref 130–400)
PMV BLD AUTO: 7.2 FL (ref 7.4–10.4)
POTASSIUM SERPL-SCNC: 4.1 MEQ/L (ref 3.5–5.2)
RBC # BLD: 4.36 MILL/MM3 (ref 4.7–6.1)
SODIUM BLD-SCNC: 139 MEQ/L (ref 135–145)
TOTAL PROTEIN: 7.3 G/DL (ref 6.1–8)
WBC # BLD: 7.6 THOU/MM3 (ref 4.8–10.8)

## 2018-03-29 PROCEDURE — 80053 COMPREHEN METABOLIC PANEL: CPT

## 2018-03-29 PROCEDURE — 86140 C-REACTIVE PROTEIN: CPT

## 2018-03-29 PROCEDURE — 85027 COMPLETE CBC AUTOMATED: CPT

## 2018-04-02 LAB
FUNGUS IDENTIFIED: NORMAL
FUNGUS SMEAR: NORMAL

## 2018-04-05 ENCOUNTER — HOSPITAL ENCOUNTER (OUTPATIENT)
Age: 58
Setting detail: SPECIMEN
Discharge: HOME | End: 2018-04-05
Payer: MEDICAID

## 2018-04-05 LAB
ALBUMIN SERPL-MCNC: 4.1 G/DL (ref 3.5–5.1)
ALP BLD-CCNC: 100 U/L (ref 38–126)
ALT SERPL-CCNC: 6 U/L (ref 11–66)
ANION GAP SERPL CALCULATED.3IONS-SCNC: 14 MEQ/L (ref 8–16)
AST SERPL-CCNC: 17 U/L (ref 5–40)
BILIRUB SERPL-MCNC: 0.3 MG/DL (ref 0.3–1.2)
BUN BLDV-MCNC: 16 MG/DL (ref 7–22)
C-REACTIVE PROTEIN: 1.2 MG/DL (ref 0–1)
CALCIUM SERPL-MCNC: 9.3 MG/DL (ref 8.5–10.5)
CHLORIDE BLD-SCNC: 99 MEQ/L (ref 98–111)
CO2: 26 MEQ/L (ref 23–33)
CREAT SERPL-MCNC: 0.8 MG/DL (ref 0.4–1.2)
GFR SERPL CREATININE-BSD FRML MDRD: > 90 ML/MIN/1.73M2
GLUCOSE BLD-MCNC: 112 MG/DL (ref 70–108)
HCT VFR BLD CALC: 40.2 % (ref 42–52)
HEMOGLOBIN: 13.6 GM/DL (ref 14–18)
MCH RBC QN AUTO: 31.6 PG (ref 27–31)
MCHC RBC AUTO-ENTMCNC: 33.9 GM/DL (ref 33–37)
MCV RBC AUTO: 93.5 FL (ref 80–94)
PDW BLD-RTO: 14.6 % (ref 11.5–14.5)
PLATELET # BLD: 190 THOU/MM3 (ref 130–400)
PMV BLD AUTO: 6.9 FL (ref 7.4–10.4)
POTASSIUM SERPL-SCNC: 4.3 MEQ/L (ref 3.5–5.2)
RBC # BLD: 4.3 MILL/MM3 (ref 4.7–6.1)
SODIUM BLD-SCNC: 139 MEQ/L (ref 135–145)
TOTAL PROTEIN: 7.5 G/DL (ref 6.1–8)
WBC # BLD: 7.5 THOU/MM3 (ref 4.8–10.8)

## 2018-04-05 PROCEDURE — 86140 C-REACTIVE PROTEIN: CPT

## 2018-04-05 PROCEDURE — 85027 COMPLETE CBC AUTOMATED: CPT

## 2018-04-05 PROCEDURE — 80053 COMPREHEN METABOLIC PANEL: CPT

## 2018-04-11 ENCOUNTER — HOSPITAL ENCOUNTER (OUTPATIENT)
Dept: WOUND CARE | Age: 58
Discharge: HOME | End: 2018-04-11
Payer: MEDICAID

## 2018-04-11 VITALS
WEIGHT: 209.9 LBS | DIASTOLIC BLOOD PRESSURE: 105 MMHG | HEIGHT: 75 IN | OXYGEN SATURATION: 95 % | HEART RATE: 92 BPM | RESPIRATION RATE: 18 BRPM | SYSTOLIC BLOOD PRESSURE: 190 MMHG | TEMPERATURE: 98.7 F | BODY MASS INDEX: 26.1 KG/M2

## 2018-04-11 DIAGNOSIS — M46.20 PARASPINAL ABSCESS (HCC): ICD-10-CM

## 2018-04-11 DIAGNOSIS — B95.8 STAPHYLOCOCCUS INFECTION: Primary | ICD-10-CM

## 2018-04-11 PROCEDURE — 99212 OFFICE O/P EST SF 10 MIN: CPT

## 2018-04-11 RX ORDER — ACETAMINOPHEN 500 MG
500 TABLET ORAL EVERY 6 HOURS PRN
COMMUNITY

## 2018-04-11 RX ORDER — OXYCODONE HYDROCHLORIDE 5 MG/1
5 TABLET ORAL EVERY 6 HOURS PRN
COMMUNITY

## 2018-04-11 ASSESSMENT — PAIN DESCRIPTION - PROGRESSION: CLINICAL_PROGRESSION: GRADUALLY IMPROVING

## 2018-04-11 ASSESSMENT — PAIN DESCRIPTION - LOCATION: LOCATION: NECK

## 2018-04-11 ASSESSMENT — PAIN DESCRIPTION - DESCRIPTORS: DESCRIPTORS: OTHER (COMMENT)

## 2018-04-11 ASSESSMENT — PAIN DESCRIPTION - ONSET: ONSET: AWAKENED FROM SLEEP

## 2018-04-11 ASSESSMENT — PAIN DESCRIPTION - PAIN TYPE: TYPE: ACUTE PAIN

## 2018-04-11 ASSESSMENT — PAIN DESCRIPTION - FREQUENCY: FREQUENCY: CONTINUOUS

## 2018-04-11 ASSESSMENT — PAIN SCALES - GENERAL: PAINLEVEL_OUTOF10: 4

## 2018-04-13 ENCOUNTER — HOSPITAL ENCOUNTER (OUTPATIENT)
Age: 58
Setting detail: SPECIMEN
Discharge: HOME | End: 2018-04-13
Payer: MEDICAID

## 2018-04-13 LAB
ALBUMIN SERPL-MCNC: 3.7 G/DL (ref 3.5–5.1)
ALP BLD-CCNC: 111 U/L (ref 38–126)
ALT SERPL-CCNC: < 5 U/L (ref 11–66)
ANION GAP SERPL CALCULATED.3IONS-SCNC: 14 MEQ/L (ref 8–16)
ANISOCYTOSIS: ABNORMAL
AST SERPL-CCNC: 16 U/L (ref 5–40)
BASOPHILS # BLD: 0.5 %
BASOPHILS ABSOLUTE: 0 THOU/MM3 (ref 0–0.1)
BILIRUB SERPL-MCNC: 0.2 MG/DL (ref 0.3–1.2)
BUN BLDV-MCNC: 16 MG/DL (ref 7–22)
C-REACTIVE PROTEIN: 0.95 MG/DL (ref 0–1)
CALCIUM SERPL-MCNC: 9.5 MG/DL (ref 8.5–10.5)
CHLORIDE BLD-SCNC: 99 MEQ/L (ref 98–111)
CO2: 26 MEQ/L (ref 23–33)
CREAT SERPL-MCNC: 0.7 MG/DL (ref 0.4–1.2)
EOSINOPHIL # BLD: 3 %
EOSINOPHILS ABSOLUTE: 0.2 THOU/MM3 (ref 0–0.4)
GFR SERPL CREATININE-BSD FRML MDRD: > 90 ML/MIN/1.73M2
GLUCOSE BLD-MCNC: 144 MG/DL (ref 70–108)
HCT VFR BLD CALC: 39.2 % (ref 42–52)
HEMOGLOBIN: 12.9 GM/DL (ref 14–18)
LYMPHOCYTES # BLD: 33.5 %
LYMPHOCYTES ABSOLUTE: 2.2 THOU/MM3 (ref 1–4.8)
MCH RBC QN AUTO: 30.8 PG (ref 27–31)
MCHC RBC AUTO-ENTMCNC: 33 GM/DL (ref 33–37)
MCV RBC AUTO: 93.3 FL (ref 80–94)
MONOCYTES # BLD: 8.8 %
MONOCYTES ABSOLUTE: 0.6 THOU/MM3 (ref 0.4–1.3)
NUCLEATED RED BLOOD CELLS: 0 /100 WBC
PDW BLD-RTO: 15.5 % (ref 11.5–14.5)
PLATELET # BLD: 247 THOU/MM3 (ref 130–400)
PMV BLD AUTO: 6.8 FL (ref 7.4–10.4)
POTASSIUM SERPL-SCNC: 4 MEQ/L (ref 3.5–5.2)
RBC # BLD: 4.2 MILL/MM3 (ref 4.7–6.1)
SEG NEUTROPHILS: 54.2 %
SEGMENTED NEUTROPHILS ABSOLUTE COUNT: 3.6 THOU/MM3 (ref 1.8–7.7)
SODIUM BLD-SCNC: 139 MEQ/L (ref 135–145)
TOTAL PROTEIN: 7 G/DL (ref 6.1–8)
WBC # BLD: 6.6 THOU/MM3 (ref 4.8–10.8)

## 2018-04-13 PROCEDURE — 86140 C-REACTIVE PROTEIN: CPT

## 2018-04-13 PROCEDURE — 85025 COMPLETE CBC W/AUTO DIFF WBC: CPT

## 2018-04-13 PROCEDURE — 80053 COMPREHEN METABOLIC PANEL: CPT

## 2018-04-30 ENCOUNTER — HOSPITAL ENCOUNTER (OUTPATIENT)
Age: 58
Discharge: HOME | End: 2018-04-30
Payer: MEDICAID

## 2018-04-30 ENCOUNTER — HOSPITAL ENCOUNTER (OUTPATIENT)
Dept: GENERAL RADIOLOGY | Age: 58
Discharge: HOME | End: 2018-04-30
Payer: MEDICAID

## 2018-04-30 DIAGNOSIS — G06.1 ABSCESS IN EPIDURAL SPACE OF CERVICAL SPINE: ICD-10-CM

## 2018-04-30 PROCEDURE — 72040 X-RAY EXAM NECK SPINE 2-3 VW: CPT

## 2018-07-13 ENCOUNTER — HOSPITAL ENCOUNTER (OUTPATIENT)
Dept: MRI IMAGING | Age: 58
Discharge: HOME | End: 2018-07-13
Payer: MEDICAID

## 2018-07-13 DIAGNOSIS — G06.1 EPIDURAL ABSCESS, L2-L5: ICD-10-CM

## 2018-07-13 PROCEDURE — 6360000004 HC RX CONTRAST MEDICATION: Performed by: NEUROLOGICAL SURGERY

## 2018-07-13 PROCEDURE — 72156 MRI NECK SPINE W/O & W/DYE: CPT

## 2018-07-13 PROCEDURE — A9579 GAD-BASE MR CONTRAST NOS,1ML: HCPCS | Performed by: NEUROLOGICAL SURGERY

## 2018-07-13 RX ADMIN — GADOTERIDOL 20 ML: 279.3 INJECTION, SOLUTION INTRAVENOUS at 12:00

## 2018-11-20 NOTE — PROCEDURES
PICC Procedure Note    James Moctezuma   Admitted- 3/1/2018  6:51 PM  Admission diagnosis- Paraspinal abscess [facility]) [M46.20]      Attending Physician- Kraig Brower MD  Ordering Physician-Dr. Cesar Black  Indication for Insertion: Antibiotic Therapy    Catheter Insertion Date- 3/7/2018   Lot Number- [id number]  Gauge-5  Lumen-dual    Insertion Site- KATERIN Basilic  Vein Diameter- 3 mm  Catheter Length- 47 cm  Internal Length- 47 cm  Exposed Catheter Length- 0cm   Upper Arm Circumference- 29cm  Tip Confirmation System Bundle met- Yes  If X-ray required, Tip Location- na  Radiologist- na    PICC insertion successful- Yes  Ultrasound- yes    Okay To Use PICC- Yes    Electronically signed by Alyse Andersen RN on 3/7/2018 at 2:41 PM English

## (undated) DEVICE — HYPODERMIC SAFETY NEEDLE: Brand: MAGELLAN

## (undated) DEVICE — BIPOLAR SEALER 23-112-1 AQM 6.0: Brand: AQUAMANTYS ®

## (undated) DEVICE — OIL CARTRIDGE: Brand: CORE, MAESTRO

## (undated) DEVICE — INTENDED FOR TISSUE SEPARATION, AND OTHER PROCEDURES THAT REQUIRE A SHARP SURGICAL BLADE TO PUNCTURE OR CUT.: Brand: BARD-PARKER ® CARBON RIB-BACK BLADES

## (undated) DEVICE — BLADE CLIPPER GEN PURP NS

## (undated) DEVICE — SUTURE VCRL SZ 3-0 L18IN ABSRB VLT SUTUPAK PRECUT W/O NDL J104T

## (undated) DEVICE — SUTURE VCRL SZ 4-0 L18IN ABSRB UD VCRL POLYGLACTIN 910 COAT J109T

## (undated) DEVICE — CONTAINER,SPECIMEN,PNEU TUBE,4OZ,OR STRL: Brand: MEDLINE

## (undated) DEVICE — 3M™ STERI-DRAPE™ INSTRUMENT POUCH 1018: Brand: STERI-DRAPE™

## (undated) DEVICE — SPONGE GZ W4XL4IN COT 12 PLY TYP VII WVN C FLD DSGN

## (undated) DEVICE — GOWN,SIRUS,NONRNF,SETINSLV,XL,20/CS: Brand: MEDLINE

## (undated) DEVICE — COVER ARMBRD W13XL28.5IN IMPERV BLU FOR OP RM

## (undated) DEVICE — WAX SURG 2.5GM HEMSTAT BNE BEESWAX PARAFFIN ISO PALMITATE

## (undated) DEVICE — GAUZE,SPONGE,8"X4",12PLY,XRAY,STRL,LF: Brand: MEDLINE

## (undated) DEVICE — TRAY CATH 16FR F INCLUDE LUB DRNGE BG STATLOK STBL DEV

## (undated) DEVICE — 1010 S-DRAPE TOWEL DRAPE 10/BX: Brand: STERI-DRAPE™

## (undated) DEVICE — SUREFIT, DUAL DISPERSIVE ELECTRODE, CONTACT QUALITY MONITOR: Brand: SUREFIT

## (undated) DEVICE — SYRINGE MED 10ML LUERLOCK TIP W/O SFTY DISP

## (undated) DEVICE — PAD OP RM W20XL72XH2IN PRECIS CUT FLAT EC BIOCLINIC

## (undated) DEVICE — SET DRN PVC DBL RND W/ TRCR 1/8 IN MID PERF 12 H PAT

## (undated) DEVICE — GLOVE SURG SZ 65 THK91MIL LTX FREE SYN POLYISOPRENE

## (undated) DEVICE — PAD,NON-ADHERENT,3X8,STERILE,LF,1/PK: Brand: MEDLINE

## (undated) DEVICE — NEEDLE SPNL L3.5IN PNK HUB S STL REG WALL FIT STYL W/ QNCKE

## (undated) DEVICE — SUTURE SILK 2-0 CP-1 30IN N ABSRB BRAID BLK 443H

## (undated) DEVICE — CONMED DISPOSABLE BIPOLAR CABLE, 10' (3.05M): Brand: CONMED

## (undated) DEVICE — SUTURE ABSRB L45CM L36MM SZ 2-0 OS-6 VLT POLYGLACTIN 910 J710T

## (undated) DEVICE — SHEET, T, LAPAROTOMY, STERILE: Brand: MEDLINE

## (undated) DEVICE — TUBING, SUCTION, 1/4" X 20', STRAIGHT: Brand: MEDLINE INDUSTRIES, INC.

## (undated) DEVICE — GLOVE ORANGE PI 8 1/2   MSG9085

## (undated) DEVICE — CODMAN® SURGICAL PATTIES 1/2" X1 1/2" (1.27CM X 3.81CM): Brand: CODMAN®

## (undated) DEVICE — GOWN,SIRUS,NON REINFRCD,LARGE,SET IN SL: Brand: MEDLINE

## (undated) DEVICE — 4-PORT MANIFOLD: Brand: NEPTUNE 2

## (undated) DEVICE — DRESSING GRMCDL 6 12FR D1N CNTR HOLE 4MM ANTMCRBL PRTCTVE DI

## (undated) DEVICE — 3M™ DURAPORE™ SURGICAL TAPE 1538-3, 3 INCH X 10 YARD (7,5CM X 9,1M), 4 ROLLS/BOX: Brand: 3M™ DURAPORE™

## (undated) DEVICE — SUTURE VCRL SZ 3-0 L18IN ABSRB UD CP-2 L26MM 1/2 CIR REV J761D

## (undated) DEVICE — 3.0MM NEURO (MATCH HEAD) SOFT TOUCH

## (undated) DEVICE — EVACUATOR SURG 400CC PVC 3 SPR BLB FOR WND DRNGE

## (undated) DEVICE — DRAPE C ARM W36XL30IN RECTANG BND BG AND TAPE

## (undated) DEVICE — Device

## (undated) DEVICE — SUTURE VCRL SZ 0 L45CM ABSRB VLT OS-6 L36.4MM 1/2 CIR REV J711T

## (undated) DEVICE — CHLORAPREP 26ML ORANGE

## (undated) DEVICE — SOLUTION IV 1000ML 0.9% SOD CHL PH 5 INJ USP VIAFLX PLAS

## (undated) DEVICE — 3M™ IOBAN™ 2 ANTIMICROBIAL INCISE DRAPE 6650EZ: Brand: IOBAN™ 2

## (undated) DEVICE — SYRINGE IRRIG 60ML SFT PLIABLE BLB EZ TO GRP 1 HND USE W/

## (undated) DEVICE — SUTURE VCRL SZ 0 L36IN ABSRB VLT L36MM CT-1 1/2 CIR J346H

## (undated) DEVICE — DIFFUSER: Brand: CORE, MAESTRO